# Patient Record
Sex: MALE | Race: WHITE | NOT HISPANIC OR LATINO | Employment: OTHER | URBAN - NONMETROPOLITAN AREA
[De-identification: names, ages, dates, MRNs, and addresses within clinical notes are randomized per-mention and may not be internally consistent; named-entity substitution may affect disease eponyms.]

---

## 2018-03-08 ENCOUNTER — DOCUMENTATION ONLY (OUTPATIENT)
Dept: FAMILY MEDICINE | Facility: OTHER | Age: 78
End: 2018-03-08

## 2018-03-08 PROBLEM — N18.30 CHRONIC KIDNEY DISEASE (CKD), STAGE III (MODERATE) (H): Status: ACTIVE | Noted: 2018-03-08

## 2018-03-08 PROBLEM — E78.5 HYPERLIPIDEMIA: Status: ACTIVE | Noted: 2018-03-08

## 2018-03-08 PROBLEM — I10 HYPERTENSION: Status: ACTIVE | Noted: 2018-03-08

## 2018-03-08 PROBLEM — E66.9 OBESITY: Status: ACTIVE | Noted: 2018-03-08

## 2018-03-08 PROBLEM — L60.0 ONYCHOCRYPTOSIS: Status: ACTIVE | Noted: 2018-03-08

## 2018-03-08 PROBLEM — G47.30 SLEEP APNEA: Status: ACTIVE | Noted: 2018-03-08

## 2018-03-08 PROBLEM — R73.01 FASTING HYPERGLYCEMIA: Status: ACTIVE | Noted: 2018-03-08

## 2018-03-08 RX ORDER — CHLORTHALIDONE 25 MG/1
TABLET ORAL
COMMUNITY
Start: 2015-12-15 | End: 2021-03-31

## 2018-03-08 RX ORDER — DICLOFENAC POTASSIUM 50 MG/1
50 TABLET, FILM COATED ORAL DAILY
COMMUNITY
Start: 2015-09-22 | End: 2021-03-31

## 2018-03-08 RX ORDER — METOPROLOL SUCCINATE 200 MG/1
TABLET, EXTENDED RELEASE ORAL
COMMUNITY
Start: 2015-12-15 | End: 2021-03-31

## 2018-03-08 RX ORDER — ALLOPURINOL 300 MG/1
TABLET ORAL
COMMUNITY
Start: 2015-12-15

## 2018-03-08 RX ORDER — METFORMIN HCL 500 MG
500 TABLET, EXTENDED RELEASE 24 HR ORAL DAILY
COMMUNITY
Start: 2016-02-22 | End: 2021-07-06

## 2018-03-08 RX ORDER — SIMVASTATIN 40 MG
40 TABLET ORAL AT BEDTIME
COMMUNITY
Start: 2015-12-15 | End: 2021-03-31

## 2018-03-08 RX ORDER — DILTIAZEM HYDROCHLORIDE 240 MG/1
CAPSULE, EXTENDED RELEASE ORAL
COMMUNITY
Start: 2016-07-22 | End: 2021-03-31

## 2020-10-22 ENCOUNTER — ALLIED HEALTH/NURSE VISIT (OUTPATIENT)
Dept: FAMILY MEDICINE | Facility: OTHER | Age: 80
End: 2020-10-22
Payer: MEDICARE

## 2020-10-22 DIAGNOSIS — Z20.822 COVID-19 RULED OUT: Primary | ICD-10-CM

## 2020-10-22 DIAGNOSIS — Z20.822 ENCOUNTER FOR LABORATORY TESTING FOR COVID-19 VIRUS: ICD-10-CM

## 2020-10-22 PROCEDURE — 99207 PR NO CHARGE NURSE ONLY: CPT

## 2020-10-22 PROCEDURE — U0003 INFECTIOUS AGENT DETECTION BY NUCLEIC ACID (DNA OR RNA); SEVERE ACUTE RESPIRATORY SYNDROME CORONAVIRUS 2 (SARS-COV-2) (CORONAVIRUS DISEASE [COVID-19]), AMPLIFIED PROBE TECHNIQUE, MAKING USE OF HIGH THROUGHPUT TECHNOLOGIES AS DESCRIBED BY CMS-2020-01-R: HCPCS | Mod: ZL

## 2020-10-22 PROCEDURE — C9803 HOPD COVID-19 SPEC COLLECT: HCPCS

## 2020-10-23 LAB
SARS-COV-2 RNA SPEC QL NAA+PROBE: NOT DETECTED
SPECIMEN SOURCE: NORMAL

## 2020-11-03 ENCOUNTER — TELEPHONE (OUTPATIENT)
Dept: CARDIAC REHAB | Facility: OTHER | Age: 80
End: 2020-11-03

## 2020-11-03 NOTE — TELEPHONE ENCOUNTER
[facility] called left message re:  Duane referral for cardiac rehab.  Upon review of his chart it appears they meant to call [facility] Cardiac Review.  RN called [facility] and referral given to Jeannette at [facility] for cardiac rehab.

## 2021-03-30 ENCOUNTER — TRANSFERRED RECORDS (OUTPATIENT)
Dept: HEALTH INFORMATION MANAGEMENT | Facility: OTHER | Age: 81
End: 2021-03-30

## 2021-03-31 DIAGNOSIS — I20.89 STABLE ANGINA (H): ICD-10-CM

## 2021-03-31 DIAGNOSIS — I82.622 ACUTE DEEP VEIN THROMBOSIS (DVT) OF LEFT UPPER EXTREMITY, UNSPECIFIED VEIN (H): ICD-10-CM

## 2021-03-31 DIAGNOSIS — R30.0 DYSURIA: ICD-10-CM

## 2021-03-31 DIAGNOSIS — I48.92 ATRIAL FLUTTER (H): ICD-10-CM

## 2021-03-31 DIAGNOSIS — G89.29 CHRONIC MIDLINE LOW BACK PAIN WITHOUT SCIATICA: ICD-10-CM

## 2021-03-31 DIAGNOSIS — I71.21 ASCENDING AORTIC ANEURYSM (H): ICD-10-CM

## 2021-03-31 DIAGNOSIS — Z95.1 S/P CABG (CORONARY ARTERY BYPASS GRAFT): ICD-10-CM

## 2021-03-31 DIAGNOSIS — M54.50 CHRONIC MIDLINE LOW BACK PAIN WITHOUT SCIATICA: ICD-10-CM

## 2021-03-31 DIAGNOSIS — R31.0 GROSS HEMATURIA: ICD-10-CM

## 2021-03-31 DIAGNOSIS — I77.1 SUBCLAVIAN ARTERY STENOSIS, LEFT (H): ICD-10-CM

## 2021-03-31 DIAGNOSIS — I48.91 ATRIAL FIBRILLATION (H): ICD-10-CM

## 2021-03-31 DIAGNOSIS — I25.10 ASCVD (ARTERIOSCLEROTIC CARDIOVASCULAR DISEASE): ICD-10-CM

## 2021-03-31 DIAGNOSIS — R80.9 MICROALBUMINURIA: ICD-10-CM

## 2021-03-31 RX ORDER — AMLODIPINE BESYLATE 10 MG/1
10 TABLET ORAL DAILY
COMMUNITY
Start: 2020-11-04 | End: 2024-02-06

## 2021-03-31 RX ORDER — ATORVASTATIN CALCIUM 40 MG/1
40 TABLET, FILM COATED ORAL AT BEDTIME
COMMUNITY
Start: 2020-07-28

## 2021-03-31 RX ORDER — CLOPIDOGREL BISULFATE 75 MG/1
75 TABLET ORAL DAILY
COMMUNITY
End: 2024-02-06

## 2021-03-31 RX ORDER — ASPIRIN 81 MG/1
81 TABLET, CHEWABLE ORAL DAILY
COMMUNITY
Start: 2020-11-04

## 2021-03-31 RX ORDER — NITROGLYCERIN 0.4 MG/1
0.4 TABLET SUBLINGUAL EVERY 5 MIN PRN
COMMUNITY
Start: 2020-07-28

## 2021-03-31 RX ORDER — CARVEDILOL 25 MG/1
1.5 TABLET ORAL 2 TIMES DAILY
COMMUNITY
Start: 2020-11-03 | End: 2024-02-06

## 2021-03-31 RX ORDER — FUROSEMIDE 20 MG
20 TABLET ORAL
COMMUNITY
Start: 2020-11-03 | End: 2024-02-06

## 2021-03-31 RX ORDER — POTASSIUM CHLORIDE 750 MG/1
10 TABLET, EXTENDED RELEASE ORAL DAILY
COMMUNITY
Start: 2020-11-03

## 2021-03-31 RX ORDER — FAMOTIDINE 20 MG
2 TABLET ORAL DAILY
COMMUNITY

## 2021-03-31 RX ORDER — ONDANSETRON 4 MG/1
4 TABLET, FILM COATED ORAL EVERY 12 HOURS PRN
COMMUNITY
Start: 2020-09-30

## 2021-03-31 RX ORDER — AMIODARONE HYDROCHLORIDE 200 MG/1
1 TABLET ORAL DAILY
COMMUNITY
Start: 2020-11-03 | End: 2024-02-06

## 2021-03-31 RX ORDER — BLOOD SUGAR DIAGNOSTIC
STRIP MISCELLANEOUS
COMMUNITY
Start: 2020-08-09

## 2021-03-31 RX ORDER — LANCETS
EACH MISCELLANEOUS
COMMUNITY
Start: 2020-08-09

## 2021-03-31 SDOH — HEALTH STABILITY: MENTAL HEALTH: HOW OFTEN DO YOU HAVE SIX OR MORE DRINKS ON ONE OCCASION?: NOT ASKED

## 2021-03-31 SDOH — HEALTH STABILITY: MENTAL HEALTH: HOW MANY DRINKS CONTAINING ALCOHOL DO YOU HAVE ON A TYPICAL DAY WHEN YOU ARE DRINKING?: 1 OR 2

## 2021-03-31 SDOH — HEALTH STABILITY: MENTAL HEALTH: HOW OFTEN DO YOU HAVE A DRINK CONTAINING ALCOHOL?: 2-4 TIMES A MONTH

## 2021-04-01 ENCOUNTER — OFFICE VISIT (OUTPATIENT)
Dept: UROLOGY | Facility: OTHER | Age: 81
End: 2021-04-01
Attending: UROLOGY
Payer: COMMERCIAL

## 2021-04-01 VITALS
DIASTOLIC BLOOD PRESSURE: 72 MMHG | SYSTOLIC BLOOD PRESSURE: 122 MMHG | RESPIRATION RATE: 18 BRPM | BODY MASS INDEX: 32.83 KG/M2 | WEIGHT: 206 LBS | HEART RATE: 59 BPM

## 2021-04-01 DIAGNOSIS — R31.0 GROSS HEMATURIA: Primary | ICD-10-CM

## 2021-04-01 PROCEDURE — G0463 HOSPITAL OUTPT CLINIC VISIT: HCPCS | Mod: 25

## 2021-04-01 PROCEDURE — 51798 US URINE CAPACITY MEASURE: CPT | Performed by: UROLOGY

## 2021-04-01 PROCEDURE — 99204 OFFICE O/P NEW MOD 45 MIN: CPT | Performed by: UROLOGY

## 2021-04-01 ASSESSMENT — PAIN SCALES - GENERAL: PAINLEVEL: NO PAIN (0)

## 2021-04-01 NOTE — PROGRESS NOTES
PCP:  Opal Vizcaino NP    Type of Visit  NPV    Chief Complaint  Hematuria    HPI  Mr. Chen is a 81 year old male with history of CVD who presents with hematuria.  Gross hematuria started 3 days ago.  Episode lasted about 2 days and then resolved spontaneously.  Patient denies associated dysuria at the time of onset.  Patient denies clots associated with hematuria.    He started Xarelto about 1 month ago.  The patient is a former smoker from the age of high school through the age of 52.  He has no history of kidney stones and has a history of a CT scan from 1 year ago revealing no stones at that time.    Hematuria-related signs/symptoms  History of smoking?    Yes  History of chemotherapy?   No  History of pelvic radiation?   No  History of kidney or bladder stones?  No  History of frequent urinary tract infections? No     Outside hospital records were reviewed including labs and notes from [facility]      Past Medical History  He  has a past medical history of Acute kidney failure (H), Acute tubulo-interstitial nephritis, Angina pectoris (H), Benign neoplasm of colon, Chronic kidney disease, stage III (moderate), Essential (primary) hypertension, Gout, Hyperlipidemia, Impaired fasting glucose, Obesity, Obesity, Other chest pain, and Sleep apnea.  Patient Active Problem List   Diagnosis     Allergic rhinitis     Atypical chest pain     Chronic kidney disease (CKD), stage III (moderate)     Diverticulosis of sigmoid colon     Fasting hyperglycemia     Gout     H/O adenomatous polyp of colon     Hemorrhoid prolapse     Hyperlipidemia     Hypertension     Obesity     Onychocryptosis     Onychomycosis     Rectal bleeding     Sleep apnea     Dysuria     Gross hematuria     Atrial fibrillation (H)     Atrial flutter (H)     Acute deep vein thrombosis (DVT) of left upper extremity, unspecified vein (H)     S/P CABG (coronary artery bypass graft)     ASCVD (arteriosclerotic cardiovascular disease)     Subclavian artery  stenosis, left (H)     Stable angina (H)     Ascending aortic aneurysm (H)     Microalbuminuria     Chronic midline low back pain without sciatica     Type 2 diabetes mellitus (H)       Past Surgical History  He  has a past surgical history that includes Colonoscopy; other surgical history; Appendectomy open; and Extracapsular cataract extration with intraocular lens implant.    Medications  He has a current medication list which includes the following prescription(s): allopurinol, amiodarone, amlodipine, aspirin, atorvastatin, accu-chek cm plus, blood glucose monitoring, carvedilol, clopidogrel, furosemide, magnesium oxide, metformin, nitroglycerin, ondansetron, potassium chloride er, rivaroxaban anticoagulant, and vitamin d (cholecalciferol).    Allergies  Allergies   Allergen Reactions     Amlodipine Unknown       Social History  He  reports that he quit smoking about 31 years ago. His smoking use included cigarettes. He has a 25.00 pack-year smoking history. He has never used smokeless tobacco. He reports current alcohol use of about 5.0 standard drinks of alcohol per week.  No drug abuse.    Family History  Family History   Problem Relation Age of Onset     Diabetes Mother         Diabetes     Diabetes Father         Diabetes     Diabetes Brother         Diabetes     Diabetes Brother         Diabetes       Review of Systems  I personally reviewed the ROS with the patient.    Nursing Notes:   Tiffani Coates LPN  4/1/2021 11:11 AM  Signed  Pt presents to clinic for hematuria consult    Review of Systems:    Weight loss:    No     Recent fever/chills:  No   Night sweats:   No  Current skin rash:  No   Recent hair loss:  No  Heat intolerance:  No   Cold intolerance:  No  Chest pain:   No   Palpitations:   No  Shortness of breath:  No   Wheezing:   No  Constipation:    No   Diarrhea:   No   Nausea:   Yes   Vomiting:   No   Kidney/side pain:  No   Back pain:   Yes  Frequent headaches:  No   Dizziness:      No  Leg swelling:   No   Calf pain:    No    Parents, brothers or sisters with history of kidney cancer:   No  Parents, brothers or sisters with history of bladder cancer: No  Father or brother with history of prostate cancer:  No    Post-Void Residual  A post-void residual was measured by ultrasonic bladder scanner.  2 mL          Physical Exam  Vitals:    04/01/21 1103   BP: 122/72   BP Location: Right arm   Cuff Size: Adult Regular   Pulse: 59   Resp: 18   Weight: 93.4 kg (206 lb)     Constitutional: No acute distress.  Alert and cooperative   Head: NCAT  Eyes: Conjunctivae normal  Cardiovascular: Regular rate.  Pulmonary/Chest: Respirations are even and non-labored bilaterally, no audible wheezing  Abdominal: Soft. No distension, tenderness, masses or guarding.   Neurological: A + O x 3.  Cranial Nerves II-XII grossly intact.  Extremities: MUSTAPHA x 4, Warm. No clubbing.  No cyanosis.    Skin: Pink, warm and dry.  No visible rashes noted.  Psychiatric:  Normal mood and affect  Back:  No left CVA tenderness.  No right CVA tenderness.  Genitourinary:  Nonpalpable bladder    Labs  Outside hospital records reviewed including labs  The patient's urinalysis from 3/30/2021 reveals no WBCs with greater than 100 RBCs/HPF    Imaging  CT a/p ([facility])  6/24/2020  I personally reviewed and interpreted the images and report.  IMPRESSION:   1.  Mild distal right periureteral fat stranding with slight dilation. No urolithiasis appreciated. This could reflect recent passage of a calculus. Consider CT urogram to further evaluate if indicated.  2.  Hepatic steatosis.     Post-Void Residual  A post-void residual was measured by ultrasonic bladder scanner.  2 mL today    Assessment  Mr. Chen is a 81 year old male with sterile gross hematuria.  I agree with holding antibiotics at this time as his urinalysis from 2 days ago demonstrates gross hematuria without pyuria.  In addition the patient's symptoms are not suggestive of  UTI.  I have recommended a work-up in the form of upper tract imaging and cystoscopy.    Discussed rationale for work up.  Discussed the specific indications for cross-sectional imaging as well as diagnostic cystoscopy.  Discussed potential findings of hematuria work up including, but not limited to, kidney stones, bladder tumors and/or kidney tumors.  Also discussed the potential for a normal work up.    Plan  CT Urogram with follow up for cystoscopy

## 2021-04-01 NOTE — PROGRESS NOTES
"Per last office visit  4/1/21 with Kannan Mora MD \"Plan  CT Urogram with follow up for cystoscopy\"        Orders Placed    "

## 2021-04-01 NOTE — NURSING NOTE
Pt presents to clinic for hematuria consult    Review of Systems:    Weight loss:    No     Recent fever/chills:  No   Night sweats:   No  Current skin rash:  No   Recent hair loss:  No  Heat intolerance:  No   Cold intolerance:  No  Chest pain:   No   Palpitations:   No  Shortness of breath:  No   Wheezing:   No  Constipation:    No   Diarrhea:   No   Nausea:   Yes   Vomiting:   No   Kidney/side pain:  No   Back pain:   Yes  Frequent headaches:  No   Dizziness:     No  Leg swelling:   No   Calf pain:    No    Parents, brothers or sisters with history of kidney cancer:   No  Parents, brothers or sisters with history of bladder cancer: No  Father or brother with history of prostate cancer:  No    Post-Void Residual  A post-void residual was measured by ultrasonic bladder scanner.  2 mL

## 2021-04-08 ENCOUNTER — HOSPITAL ENCOUNTER (OUTPATIENT)
Dept: CT IMAGING | Facility: OTHER | Age: 81
End: 2021-04-08
Attending: UROLOGY
Payer: MEDICARE

## 2021-04-08 ENCOUNTER — OFFICE VISIT (OUTPATIENT)
Dept: UROLOGY | Facility: OTHER | Age: 81
End: 2021-04-08
Attending: UROLOGY
Payer: MEDICARE

## 2021-04-08 VITALS — RESPIRATION RATE: 18 BRPM | BODY MASS INDEX: 32.83 KG/M2 | HEART RATE: 59 BPM | WEIGHT: 206 LBS

## 2021-04-08 DIAGNOSIS — R31.0 GROSS HEMATURIA: ICD-10-CM

## 2021-04-08 DIAGNOSIS — R31.0 GROSS HEMATURIA: Primary | ICD-10-CM

## 2021-04-08 LAB
ANION GAP SERPL CALCULATED.3IONS-SCNC: 10 MMOL/L (ref 3–14)
BUN SERPL-MCNC: 57 MG/DL (ref 7–25)
CALCIUM SERPL-MCNC: 8.9 MG/DL (ref 8.6–10.3)
CHLORIDE SERPL-SCNC: 103 MMOL/L (ref 98–107)
CO2 SERPL-SCNC: 25 MMOL/L (ref 21–31)
CREAT SERPL-MCNC: 2.91 MG/DL (ref 0.7–1.3)
CREAT SERPL-MCNC: 2.92 MG/DL (ref 0.7–1.3)
GFR SERPL CREATININE-BSD FRML MDRD: 21 ML/MIN/{1.73_M2}
GFR SERPL CREATININE-BSD FRML MDRD: 21 ML/MIN/{1.73_M2}
GLUCOSE SERPL-MCNC: 131 MG/DL (ref 70–105)
POTASSIUM SERPL-SCNC: 5 MMOL/L (ref 3.5–5.1)
SODIUM SERPL-SCNC: 138 MMOL/L (ref 134–144)

## 2021-04-08 PROCEDURE — 52000 CYSTOURETHROSCOPY: CPT | Performed by: UROLOGY

## 2021-04-08 PROCEDURE — 99213 OFFICE O/P EST LOW 20 MIN: CPT | Mod: 25 | Performed by: UROLOGY

## 2021-04-08 PROCEDURE — 80048 BASIC METABOLIC PNL TOTAL CA: CPT | Mod: ZL | Performed by: UROLOGY

## 2021-04-08 PROCEDURE — 82565 ASSAY OF CREATININE: CPT | Mod: ZL | Performed by: UROLOGY

## 2021-04-08 PROCEDURE — G0463 HOSPITAL OUTPT CLINIC VISIT: HCPCS | Mod: 25

## 2021-04-08 PROCEDURE — 74176 CT ABD & PELVIS W/O CONTRAST: CPT

## 2021-04-08 PROCEDURE — 36415 COLL VENOUS BLD VENIPUNCTURE: CPT | Mod: ZL | Performed by: UROLOGY

## 2021-04-08 NOTE — NURSING NOTE
Patient positioned in supine position, perineum area prepped with chlorhexidene Gluconate and patient draped per sterile technique. Per verbal order read back by Kannan Mora MD, Urojet 10mL 2% lidocaine jelly to be instilled into urethra.  Urojet- 10ml 2% Lidocaine jelly instilled into the urethra.    Urojet 2%  Lot#: [id number]  Expiration date: 10/2022  : [employer]  NDC: [address]-3    Hurt Protocol    A. Pre-procedure verification complete Yes  1-relevant information / documentation available, reviewed and properly matched to the patient; 2-consent accurate and complete, 3-equipment and supplies available    B. Site marking complete N/A  Site marked if not in continuous attendance with patient    C. TIME OUT completed Yes  Time Out was conducted just prior to starting procedure to verify the eight required elements: 1-patient identity, 2-consent accurate and complete, 3-position, 4-correct side/site marked (if applicable), 5-procedure, 6-relevant images / results properly labeled and displayed (if applicable), 7-antibiotics / irrigation fluids (if applicable), 8-safety precautions.    After procedure perineum area rinsed. Discharge instructions reviewed with patient. Patient verbalized understanding of discharge instructions and discharged ambulatory.  Tiffani Coates LPN..................4/8/2021  1:59 PM

## 2021-04-08 NOTE — PROGRESS NOTES
Preprocedure diagnosis  Hematuria    Postprocedure diagnosis  Hematuria    Procedure  Flexible Cystourethroscopy    Surgeon  Kannan Mora MD    Anesthesia  2% lidocaine jelly intraurethrally    Complications  None    Indications  81 year old male undergoing a flexible cystoscopy for the above mentioned indications.    Findings  Cystoscopic findings included a normal anterior urethra.    There was not a prominent median lobe.    The lateral lobes were not obstructive in appearance.  The bladder appeared to be normal capacity.    There were no tumors, stones or foreign bodies.    The orifices were slit-shaped and in their normal location.    Procedure  The patient was placed in supine position and prepped and draped in sterile fashion with lidocaine jelly per urethra for anesthesia.    I passed a lubricated 14F flexible cystoscope through the penile urethra and into the bladder and the bladder was completely visualized.  The cystoscope was retroflexed and the bladder neck and prostate visualized.    The cystoscope was slowly withdrawn while visualizing the urethra and the procedure terminated.    The patient tolerated the procedure well.      Imaging  CT Stone  4/8/2021  No stones or masses, hydronephrosis or bladder stones.    Results for orders placed or performed in visit on 04/08/21   Basic metabolic panel     Status: Abnormal   Result Value Ref Range    Sodium 138 134 - 144 mmol/L    Potassium 5.0 3.5 - 5.1 mmol/L    Chloride 103 98 - 107 mmol/L    Carbon Dioxide 25 21 - 31 mmol/L    Anion Gap 10 3 - 14 mmol/L    Glucose 131 (H) 70 - 105 mg/dL    Urea Nitrogen 57 (H) 7 - 25 mg/dL    Creatinine 2.92 (H) 0.70 - 1.30 mg/dL    GFR Estimate 21 (L) >60 mL/min/[1.73_m2]    GFR Estimate If Black 25 (L) >60 mL/min/[1.73_m2]    Calcium 8.9 8.6 - 10.3 mg/dL   Results for orders placed or performed in visit on 04/08/21   Creatinine     Status: Abnormal   Result Value Ref Range    Creatinine 2.91 (H) 0.70 - 1.30 mg/dL    GFR  Estimate 21 (L) >60 mL/min/[1.73_m2]    GFR Estimate If Black 25 (L) >60 mL/min/[1.73_m2]   Results for orders placed or performed during the hospital encounter of 04/08/21   CT Abdomen Pelvis w/o Contrast     Status: None    Narrative    PROCEDURE:  CT ABDOMEN PELVIS W/O CONTRAST    HISTORY:  Gross hematuria    TECHNIQUE:  Helical CT of the abdomen and pelvis was performed without  intravenous contrast. Post contrast imaging was deferred due to  progressive declining GFR, currently 21.    COMPARISON:  None.    FINDINGS:      Evaluation of the solid organs is somewhat limited due to the lack of  intravenous contrast.    Limited views through the lung bases demonstrate a small left pleural  effusion. There is cardiomegaly.    Precontrast imaging demonstrates no renal or ureteral calculi. There  is no hydronephrosis. Bladder is distended and otherwise unremarkable.  The prostate is enlarged. Multiple exophytic renal cortical lesions  are questioned, not well assessed absent contrast.    Small calcifications are present in the spleen. The noncontrast  appearance of the liver, gallbladder pancreas and adrenal glands are  unremarkable. The aorta is normal in size with scattered  atherosclerotic calcifications. The bowel is normal in caliber.     No suspicious osseous lesions are identified.      Impression    IMPRESSION:      No renal or ureteral calculi. No hydronephrosis.    Prostatomegaly. Distended bladder.    Indeterminant renal cortical lesions. Postcontrast imaging was  deferred due to declining low GFR. Consider ultrasound, although  habitus may limit assessment. Consider postcontrast imaging after GFR  improves.     Unilateral left pleural effusion. Recommend follow-up.    RALEIGH BLACKMON MD     Assessment & Plan  Mr. Chen is a 81 year old male who underwent imaging and cystoscopy today to complete the hematuria work-up.  Following the cystoscopy I reviewed the imaging and we discussed the imaging  results.  Provided reassurance given the negative work-up.  Regarding his sudden change in renal function I recommended urgent follow-up with his PCP.  I confirmed electrolytes today are all WNL.  Assistance was provided to schedule this appointment which has been scheduled for Tuesday, 4/13/2021              A total of 22 minutes (exclusive of separately billed services/procedures) was spent with the patient, reviewing records, tests, ordering medications, tests or procedures, counseling regarding the above described medical concern, recommendations regarding management and documenting clinical information in the EHR.

## 2021-04-08 NOTE — PROGRESS NOTES
IV Contrast- Discharge Instructions After Your CT Scan      The IV contrast you received today will be filtered from your bloodstream by your kidneys during the next 24 hours and pass from the body in urine.  You will not be aware of this process and your urine will not change in color.  To help this process you should drink at least 4 additional glasses of water or juice today.  This reduces stress on your kidneys.    Most contrast reactions are immediate.  Should you develop symptoms of concern after discharge, contact the department at the number below.  After hours you should contact your personal physician.  If you develop breathing distress or wheezing, call 911.      1.  Has the patient had a previous reaction to IV contrast? no    2.  Does the patient have kidney disease? Yes, CKD3    3.  Is the patient on dialysis? no    If YES to any of these questions, exam will be reviewed with a Radiologist before administering contrast.

## 2021-04-08 NOTE — PATIENT INSTRUCTIONS

## 2021-07-06 ENCOUNTER — HOSPITAL ENCOUNTER (EMERGENCY)
Facility: OTHER | Age: 81
Discharge: HOME | End: 2021-07-06
Attending: FAMILY MEDICINE | Admitting: FAMILY MEDICINE
Payer: MEDICARE

## 2021-07-06 VITALS
SYSTOLIC BLOOD PRESSURE: 151 MMHG | TEMPERATURE: 97.5 F | RESPIRATION RATE: 18 BRPM | HEIGHT: 66 IN | WEIGHT: 223.77 LBS | DIASTOLIC BLOOD PRESSURE: 76 MMHG | HEART RATE: 60 BPM | OXYGEN SATURATION: 97 % | BODY MASS INDEX: 35.96 KG/M2

## 2021-07-06 DIAGNOSIS — R04.0 BLEEDING FROM THE NOSE: ICD-10-CM

## 2021-07-06 DIAGNOSIS — T50.905A ADVERSE DRUG EFFECT, INITIAL ENCOUNTER: ICD-10-CM

## 2021-07-06 PROCEDURE — 99282 EMERGENCY DEPT VISIT SF MDM: CPT | Mod: 25 | Performed by: FAMILY MEDICINE

## 2021-07-06 PROCEDURE — 30901 CONTROL OF NOSEBLEED: CPT | Performed by: FAMILY MEDICINE

## 2021-07-06 PROCEDURE — 250N000009 HC RX 250: Performed by: FAMILY MEDICINE

## 2021-07-06 RX ORDER — METOPROLOL TARTRATE 25 MG/1
TABLET, FILM COATED ORAL
COMMUNITY
Start: 2020-10-19 | End: 2024-02-06

## 2021-07-06 RX ORDER — TRANEXAMIC ACID 100 MG/ML
INJECTION, SOLUTION INTRAVENOUS ONCE
Status: DISCONTINUED | OUTPATIENT
Start: 2021-07-06 | End: 2021-07-06 | Stop reason: HOSPADM

## 2021-07-06 RX ADMIN — SILVER NITRATE APPLICATORS: 25; 75 STICK TOPICAL at 08:52

## 2021-07-06 ASSESSMENT — MIFFLIN-ST. JEOR: SCORE: 1672.75

## 2021-07-06 NOTE — ED TRIAGE NOTES
"ED Nursing Triage Note (General)   ________________________________    Duane H Telecky is a 81 year old Male that presents to triage private car  With history of  Having a sore on the outside of his nose that is open and will not stop bleeding. Pt said this started this morning and on blood thinners.    BP (!) 178/88   Pulse 66   Temp 97.5  F (36.4  C) (Tympanic)   Resp 18   Ht 1.676 m (5' 6\")   Wt 101.5 kg (223 lb 12.3 oz)   SpO2 97%   BMI 36.12 kg/m  t  Patient appears alert  and oriented, in no acute distress., and cooperative, pleasant and calm behavior.  GCS Total = 15  Airway: intact  Breathing noted as Normal  Circulation Normal  Skin:  Normal  Action taken:  Triage to critical care immediately      PRE HOSPITAL PRIOR LIVING SITUATION Alone  "

## 2021-07-06 NOTE — DISCHARGE INSTRUCTIONS
Use bacitracin ointment once daily to affected area.   Pat face dry- don't rub!   Try not to  touch area other than to apply ointment at least once daily.   Followup here if recurrence.

## 2022-10-18 ENCOUNTER — HOSPITAL ENCOUNTER (OUTPATIENT)
Dept: GENERAL RADIOLOGY | Facility: OTHER | Age: 82
Discharge: HOME | End: 2022-10-18
Attending: ORTHOPAEDIC SURGERY | Admitting: FAMILY MEDICINE
Payer: MEDICARE

## 2022-10-18 DIAGNOSIS — M54.50 ACUTE RIGHT-SIDED LOW BACK PAIN: ICD-10-CM

## 2022-10-18 DIAGNOSIS — M47.816 LUMBAR SPONDYLOSIS: ICD-10-CM

## 2022-10-18 PROCEDURE — 64494 INJ PARAVERT F JNT L/S 2 LEV: CPT

## 2022-10-18 PROCEDURE — 255N000002 HC RX 255 OP 636: Performed by: RADIOLOGY

## 2022-10-18 PROCEDURE — 250N000009 HC RX 250: Performed by: RADIOLOGY

## 2022-10-18 RX ORDER — BUPIVACAINE HYDROCHLORIDE 5 MG/ML
10 INJECTION, SOLUTION EPIDURAL; INTRACAUDAL ONCE
Status: COMPLETED | OUTPATIENT
Start: 2022-10-18 | End: 2022-10-18

## 2022-10-18 RX ORDER — LIDOCAINE HYDROCHLORIDE 10 MG/ML
20 INJECTION, SOLUTION INFILTRATION; PERINEURAL ONCE
Status: COMPLETED | OUTPATIENT
Start: 2022-10-18 | End: 2022-10-18

## 2022-10-18 RX ADMIN — IOHEXOL 2 ML: 240 INJECTION, SOLUTION INTRATHECAL; INTRAVASCULAR; INTRAVENOUS; ORAL at 15:05

## 2022-10-18 RX ADMIN — LIDOCAINE HYDROCHLORIDE 4 ML: 10 INJECTION, SOLUTION INFILTRATION; PERINEURAL at 15:06

## 2022-10-18 RX ADMIN — BUPIVACAINE HYDROCHLORIDE 4 ML: 5 INJECTION, SOLUTION EPIDURAL; INTRACAUDAL; PERINEURAL at 15:06

## 2023-01-09 ENCOUNTER — HOSPITAL ENCOUNTER (OUTPATIENT)
Dept: GENERAL RADIOLOGY | Facility: OTHER | Age: 83
Discharge: HOME | End: 2023-01-09
Attending: ORTHOPAEDIC SURGERY | Admitting: ORTHOPAEDIC SURGERY
Payer: MEDICARE

## 2023-01-09 DIAGNOSIS — M47.816 LUMBAR SPONDYLOSIS: ICD-10-CM

## 2023-01-09 DIAGNOSIS — M54.50 LOW BACK PAIN: ICD-10-CM

## 2023-01-09 PROCEDURE — 64494 INJ PARAVERT F JNT L/S 2 LEV: CPT

## 2023-01-09 PROCEDURE — 250N000009 HC RX 250: Performed by: RADIOLOGY

## 2023-01-09 PROCEDURE — 255N000002 HC RX 255 OP 636: Performed by: RADIOLOGY

## 2023-01-09 RX ORDER — LIDOCAINE HYDROCHLORIDE 10 MG/ML
3 INJECTION, SOLUTION INFILTRATION; PERINEURAL ONCE
Status: COMPLETED | OUTPATIENT
Start: 2023-01-09 | End: 2023-01-09

## 2023-01-09 RX ORDER — BUPIVACAINE HYDROCHLORIDE 5 MG/ML
3 INJECTION, SOLUTION EPIDURAL; INTRACAUDAL ONCE
Status: COMPLETED | OUTPATIENT
Start: 2023-01-09 | End: 2023-01-09

## 2023-01-09 RX ADMIN — BUPIVACAINE HYDROCHLORIDE 3 ML: 5 INJECTION, SOLUTION EPIDURAL; INTRACAUDAL; PERINEURAL at 12:09

## 2023-01-09 RX ADMIN — LIDOCAINE HYDROCHLORIDE 3 ML: 10 INJECTION, SOLUTION INFILTRATION; PERINEURAL at 12:09

## 2023-01-09 RX ADMIN — IOHEXOL 2 ML: 240 INJECTION, SOLUTION INTRATHECAL; INTRAVASCULAR; INTRAVENOUS; ORAL at 12:09

## 2023-04-03 ENCOUNTER — TELEPHONE (OUTPATIENT)
Dept: GENERAL RADIOLOGY | Facility: OTHER | Age: 83
End: 2023-04-03
Payer: COMMERCIAL

## 2023-04-03 DIAGNOSIS — I48.91 ATRIAL FIBRILLATION (H): Primary | ICD-10-CM

## 2023-04-03 NOTE — TELEPHONE ENCOUNTER
" Patient is scheduled for unilateral lumbar rhizotomy on 4/10/23.     Current allergies:    Lisinopril   angioedema     Coreg [Carvedilol] RASH     Diltiazem RASH     Hydralazine RASH     Lasix [Furosemide] RASH   Rash and itchy     Oxycodone Hallucinations   \"I could see people trying to kill me\"     Warfarin RASH   Rash resolved after stopping warfarin     Xarelto [Rivaroxaban] RASH     Chlorthalidone   Dose over 12.5 mg daily causes creatinine to increase     Norvasc [Amlodipine] Other   Other reaction(s): *Unknown        Current outpatient medications:    cloNIDine (Catapres) 0.2 MG tablet Take 1 Tablet by mouth three times a day.     potassium chloride CR (K-Dur, Klor-Con M) 10 MEQ tablet TAKE 1 TABLET EVERY DAY. DO NOT CRUSH     nitroglycerin (Nitrostat) 0.4 MG sublingual tablet Place 1 Tablet under the tongue every five minutes as needed for Chest pain. Do not crush; maximum of 3 doses in 15 minutes.     atorvaSTATin (Lipitor) 40 MG tablet TAKE 1 TABLET AT BEDTIME     ciclopirox (Loprox) 0.77 % cream Apply to affected areas of the feet twice daily     ondansetron (Zofran) 4 MG tablet TAKE 1 TABLET EVERY 12 HOURS AS NEEDED FOR NAUSEA.     allopurinol (Zyloprim) 300 MG tablet Take 0.5 Tabs by mouth one time a day.     magnesium oxide 400 (240 Mg) MG Tablet Take 1 Tablet by mouth two times a day.     torsemide (Demadex) 10 MG tablet Take 1 Tablet by mouth every Monday, Wednesday and Friday. Take one tablet daily for one week and then return to three times a week dosing.     Microlet Lancets Misc USE TO CHECK BLOOD SUGARS ONCE DAILY AS DIRECTED     Contour Next Test USE TO CHECK BLOOD SUGARS ONCE DAILY OR AS DIRECTED, ICD-10-CM: E11.9, NON INSULIN DEPENDENT.     Blood Glucose Monitoring Suppl (Contour Next EZ) w/Device Kit Use as directed.     aspirin EC 81 MG tablet Take 1 Tablet by mouth one time a day. Do not split or crush.     cholecalciferol (Vitamin D3) 1000 UNIT capsule Take 2 Capsules by mouth one time a " day. 1 unit of Vitamin D equals 0.025 mcg of Vitamin D       Any anticoagulants or blood thinners? Yes, Aspirin.  If yes, patient was instructed to follow MD orders for stopping anticoagulants or blood thinners.     Any insulin or oral antihyperglycemic meds? No. If so, hold according to policy day of procedure.    Are you being treated for an infection (on antibiotics)? No. If yes, discuss with radiologist about when it is appropriate to schedule radiology procedure.    Procedure name: unilateral lumbar rhizotomy  Procedure date verified: Yes, 4/10/23.  Arrival time verified: 1215 PM Please disregard any automatic or other arrival time instructions. This is the accurate time for arrival.  Facility location verified: [address] [place] [address]. Instructed to enter through main clinic entrance, wear a mask, and proceed to Diagnostic Registration.  Plan to be here for approximately 3-3.5 hours. Explain current visitor policy to patient.    COVID test: no longer required for outpatient procedures.    Pre-procedure optimization completed within 30 days. Lab results were reviewed by this writer and are WNL or any abnormal labs discussed with radiologist. CBC RESULTS:     Any mention of previous ASA class in chart (use search tool)? 2 - Mild systemic disease (if no mention of ASA AND there are co-morbidities, call [phone] for CRNA chart review.  HARD STOP FOR ASA III, IV, or V. No moderate sedation outside of OR for ASA III, IV, or V. Inform Dr. Hester of ASA III, IV, or V at time of this note. Radiologist can consult with CRNA if any questions regarding ASA status.     CRNA consulted? Not Applicable If yes, name of CRNA performing chart review? n/a     needed? No, language: N/A    Previous sedation/analgesia: Yes  Complications of sedation/analgesia?  No  Will moderate sedation be used for this case? Yes: as planned    CPAP machine use: Yes (If yes, instructed to bring CPAP to all procedures  using moderate sedation)    Is patient a menstruating female? No If so, place order for [id number], and then call [phone] to make lab appt (must be within 7 days).    No solids after 0730 AM. (6 hours prior to start of procedure.)  No clear liquids after 1030 AM. (3 hours prior to start of procedure.)    Instructions given: expectations for procedure, expectations for recovery, dietary restrictions, and .  Preprocedure teaching completed: Yes  Method: verbal per phone.  People present for teaching: Patient  Response to teaching: patient demonstrates understanding of procedure, recovery, dietary restrictions, and .  Transportation after procedure: Yes: will arrange a ride after procedure.  Any questions or patient concerns? Yes: all questions answered

## 2023-04-10 ENCOUNTER — LAB (OUTPATIENT)
Dept: LAB | Facility: OTHER | Age: 83
End: 2023-04-10
Attending: ORTHOPAEDIC SURGERY
Payer: MEDICARE

## 2023-04-10 ENCOUNTER — HOSPITAL ENCOUNTER (OUTPATIENT)
Dept: GENERAL RADIOLOGY | Facility: OTHER | Age: 83
Discharge: HOME | End: 2023-04-10
Attending: ORTHOPAEDIC SURGERY
Payer: MEDICARE

## 2023-04-10 VITALS
HEART RATE: 82 BPM | DIASTOLIC BLOOD PRESSURE: 83 MMHG | SYSTOLIC BLOOD PRESSURE: 143 MMHG | OXYGEN SATURATION: 95 % | TEMPERATURE: 97.7 F | RESPIRATION RATE: 16 BRPM

## 2023-04-10 DIAGNOSIS — I48.91 ATRIAL FIBRILLATION (H): ICD-10-CM

## 2023-04-10 DIAGNOSIS — M25.48 EFFUSION OF LUMBAR FACET JOINT: ICD-10-CM

## 2023-04-10 DIAGNOSIS — M54.50 CHRONIC RIGHT-SIDED LOW BACK PAIN: ICD-10-CM

## 2023-04-10 DIAGNOSIS — M47.896 OTHER SPONDYLOSIS, LUMBAR REGION: ICD-10-CM

## 2023-04-10 DIAGNOSIS — G89.29 CHRONIC RIGHT-SIDED LOW BACK PAIN: ICD-10-CM

## 2023-04-10 LAB
HOLD SPECIMEN: NORMAL
INR PPP: 0.97 (ref 0.85–1.15)

## 2023-04-10 PROCEDURE — 250N000009 HC RX 250: Performed by: RADIOLOGY

## 2023-04-10 PROCEDURE — 250N000011 HC RX IP 250 OP 636: Performed by: RADIOLOGY

## 2023-04-10 PROCEDURE — 36415 COLL VENOUS BLD VENIPUNCTURE: CPT | Mod: ZL

## 2023-04-10 PROCEDURE — 85610 PROTHROMBIN TIME: CPT | Mod: ZL

## 2023-04-10 PROCEDURE — 64635 DESTROY LUMB/SAC FACET JNT: CPT

## 2023-04-10 PROCEDURE — 99152 MOD SED SAME PHYS/QHP 5/>YRS: CPT

## 2023-04-10 RX ORDER — DEXAMETHASONE SODIUM PHOSPHATE 10 MG/ML
10 INJECTION, SOLUTION INTRAMUSCULAR; INTRAVENOUS ONCE
Status: COMPLETED | OUTPATIENT
Start: 2023-04-10 | End: 2023-04-10

## 2023-04-10 RX ORDER — FENTANYL CITRATE 50 UG/ML
25-50 INJECTION, SOLUTION INTRAMUSCULAR; INTRAVENOUS EVERY 5 MIN PRN
Status: DISCONTINUED | OUTPATIENT
Start: 2023-04-10 | End: 2023-04-11 | Stop reason: HOSPADM

## 2023-04-10 RX ORDER — NALOXONE HYDROCHLORIDE 0.4 MG/ML
0.4 INJECTION, SOLUTION INTRAMUSCULAR; INTRAVENOUS; SUBCUTANEOUS
Status: DISCONTINUED | OUTPATIENT
Start: 2023-04-10 | End: 2023-04-11 | Stop reason: HOSPADM

## 2023-04-10 RX ORDER — NALOXONE HYDROCHLORIDE 0.4 MG/ML
0.2 INJECTION, SOLUTION INTRAMUSCULAR; INTRAVENOUS; SUBCUTANEOUS
Status: DISCONTINUED | OUTPATIENT
Start: 2023-04-10 | End: 2023-04-11 | Stop reason: HOSPADM

## 2023-04-10 RX ORDER — BUPIVACAINE HYDROCHLORIDE 5 MG/ML
3 INJECTION, SOLUTION EPIDURAL; INTRACAUDAL ONCE
Status: COMPLETED | OUTPATIENT
Start: 2023-04-10 | End: 2023-04-10

## 2023-04-10 RX ORDER — LIDOCAINE HYDROCHLORIDE 10 MG/ML
2 INJECTION, SOLUTION INFILTRATION; PERINEURAL ONCE
Status: COMPLETED | OUTPATIENT
Start: 2023-04-10 | End: 2023-04-10

## 2023-04-10 RX ADMIN — BUPIVACAINE HYDROCHLORIDE 7 ML: 5 INJECTION, SOLUTION EPIDURAL; INTRACAUDAL; PERINEURAL at 14:39

## 2023-04-10 RX ADMIN — LIDOCAINE HYDROCHLORIDE 3 ML: 10 INJECTION, SOLUTION INFILTRATION; PERINEURAL at 14:37

## 2023-04-10 RX ADMIN — FENTANYL CITRATE 75 MCG: 50 INJECTION, SOLUTION INTRAMUSCULAR; INTRAVENOUS at 16:05

## 2023-04-10 RX ADMIN — DEXAMETHASONE SODIUM PHOSPHATE 10 MG: 10 INJECTION INTRAMUSCULAR; INTRAVENOUS at 14:38

## 2023-04-10 NOTE — PROGRESS NOTES
"   Allergies   Allergen Reactions     Clonidine Shortness Of Breath, Dermatitis, Hives, Itching and Rash     Lisinopril      angioedema     Furosemide Rash     Rash and itchy     Oxycodone Visual Disturbance     \"I could see people trying to kill me\"     Amlodipine Unknown     Chlorthalidone      Dose over 12.5 mg daily causes creatinine to increase     Coreg [Carvedilol] Rash     Diltiazem Rash     Hydralazine Rash     Warfarin Rash     Rash resolved after stopping warfarin       Current Outpatient Medications   Medication     allopurinol (ZYLOPRIM) 300 MG tablet     amiodarone (PACERONE) 200 MG tablet     amLODIPine (NORVASC) 10 MG tablet     aspirin (ASA) 81 MG chewable tablet     atorvastatin (LIPITOR) 40 MG tablet     blood glucose (ACCU-CHEK MAIDA PLUS) test strip     blood glucose monitoring (SOFTCLIX) lancets     carvedilol (COREG) 25 MG tablet     clopidogrel (PLAVIX) 75 MG tablet     furosemide (LASIX) 20 MG tablet     magnesium oxide (MAG-OX) 400 (241.3 Mg) MG tablet     metoprolol tartrate (LOPRESSOR) 25 MG tablet     nitroGLYcerin (NITROSTAT) 0.4 MG sublingual tablet     ondansetron (ZOFRAN) 4 MG tablet     potassium chloride ER (K-TAB/KLOR-CON) 10 MEQ CR tablet     rivaroxaban ANTICOAGULANT (XARELTO) 15 MG TABS tablet     Vitamin D, Cholecalciferol, 25 MCG (1000 UT) CAPS     No current facility-administered medications for this encounter.       Previously documented ASA: 2 - Mild systemic disease (search chart for ASA) If ASA IV    Post Procedure Summary:    Prior to the start of the procedure, with procedural staff and physician participation, I verbally confirmed the patient s identity using two indicators, relevant allergies, that the procedure was appropriate and matched the consent or emergent situation, and that the correct equipment/implants were available. Immediately prior to starting the procedure I conducted the Time Out with the procedural staff and re-confirmed the patient s name, " procedure, and site/side. (The Joint Commission universal protocol was followed.)  Yes       Medications: Fentanyl 75 mcg  (moderate sedation was NOT used today)     Reversals given: None (If reversals given, must recover for at least 2 hours)    Vital signs, airway and pulse oximetry, capnography, and cardiac rhythm were monitored and remained stable throughout the procedure and sedation was maintained until the procedure was complete.  The patient was monitored by staff until sedation discharge criteria were met.    Patient tolerance: Patient tolerated the procedure well with no immediate complications.    Band-aids x 3.  CDI.  Conductive pad site located at right posterior thigh.  Skin intact.      Duane will be discharged via ride by friend Андрей.     Patient and person driving patient home verbalized understanding of discharge instructions and recommended follow up care as noted on discharge instructions.  Written discharge instructions given, denies any further questions. Belongings sent with patient.     Pain Level: 0/10

## 2023-04-10 NOTE — DISCHARGE INSTRUCTIONS
" Radiofrequency Ablation - Rhizotomy    Radiofrequency (RF) rhizotomy, or radiofrequency ablation is a therapeutic procedure designed to decrease or eliminate nerve pain symptoms that have not responded to more conservative pain treatments. The procedure uses highly localized heat generated with radiofrequency to damage the nerves causing the pain. Damaging these nerves prevents pain signals from being transmitted from the spine to the brain. In other words, the procedure should \"turn down\" the intensity of the pain. A successful procedure reduces pain without reducing nerve function.    Preliminary Testing    Before an RF rhizotomy exam can be scheduled, it is important for our radiologists to identify where the pain originates. Exams required to determine these areas might include MRI of the spine, nerve block injections with local anesthetic (numbing medication) and steroids (anti-inflammatory medication), and possibly discography.    What to Expect AFTER    When your procedure is complete, you ll be escorted back to your room so you can change out of the gown and back into your clothing.  If you are sedated, a nurse will review some guidelines that you will be asked to follow post-procedure, such as driving, drinking alcoholic beverages, etc.  You may experience numbness and/or relief from symptoms after the procedure due to the anesthetic.  Once the local anesthetic effects have worn off, your usual symptoms may return and may be more severe for up to seven days after the procedure.  Improvement occurs typically about two to three weeks after the procedure. However, it may take up to six weeks before symptoms improve and the beneficial effects of the rhizotomy are realized. Every patient is different and your outcome may vary. A sunburn-like discomfort is common, and icing the area may help with that. It should gradually go away. Take your usual pain medications if you need to.   It is sometimes helpful to keep " a record of your daily pain level to notice better the relief you may be experiencing.    [facility] Procedure   Adult Discharge Orders & Instructions      For 24 hours after surgery:  Get plenty of rest.  A responsible adult must stay with you for at least 24 hours after you leave the hospital.   You may feel lightheaded.  IF so, sit for a few minutes before standing.  Have someone help you get up.   You may have a slight fever. Call the doctor if your fever is over 101 F (38.3 C) (taken under the tongue) or lasts longer than 24 hours.  You may have a dry mouth, a sore throat, muscle aches or trouble sleeping.  These should go away after 24 hours.  Do not make important or legal decisions.  6.   Do not drive or use heavy equipment.  If you have weakness or tingling, don't drive or use heavy equipment until this feeling goes away.    To reach a doctor, please call [phone] and tell the person who answers that you had a rhizotomy by Dr. Hester on 4/10/23.     It was a privilege caring for you today. I aim to give you the highest quality care, spending time to review your chart to be familiar with your particular health issues. Thank you for allowing me to serve you today. If you have any questions or concerns, or you have ideas for how I could have improved your care today, please don't hesitate to reach me at my office number Monday - Friday from 8:00 am - 4:30 pm at [phone]. I wish you wellness. It was my pleasure to meet you.     Warmly,       Felicitas CARTER RN - [facility] Imaging Department

## 2023-05-23 PROBLEM — D50.9 IRON DEFICIENCY ANEMIA: Status: ACTIVE | Noted: 2021-05-22

## 2023-05-23 PROBLEM — Z00.00 HEALTHCARE MAINTENANCE: Status: ACTIVE | Noted: 2020-03-30

## 2023-05-23 PROBLEM — Z87.891 FORMER SMOKER: Status: ACTIVE | Noted: 2020-08-28

## 2023-05-23 PROBLEM — E53.8 LOW FOLATE: Status: ACTIVE | Noted: 2022-11-22

## 2023-05-23 PROBLEM — Z95.818 PRESENCE OF WATCHMAN LEFT ATRIAL APPENDAGE CLOSURE DEVICE: Status: ACTIVE | Noted: 2021-12-03

## 2023-05-23 PROBLEM — I48.0 PAROXYSMAL ATRIAL FIBRILLATION (H): Status: ACTIVE | Noted: 2021-01-26

## 2023-05-23 PROBLEM — N18.4 CKD (CHRONIC KIDNEY DISEASE) STAGE 4, GFR 15-29 ML/MIN (H): Status: ACTIVE | Noted: 2017-08-07

## 2023-05-30 NOTE — PATIENT INSTRUCTIONS
Thank you for allowing Inna Chowdhury and our ENT team to participate in your care.  If your medications are too expensive, please give the nurse a call.  We can possibly change this medication.  If you have a scheduling or an appointment question please contact our Health Unit Coordinator at their direct line [phone] ext [phone].   ALL nursing questions or concerns can be directed to your ENT nurse at: [phone] - ann       POST PROCEDURE INSTRUCTIONS      Wash incision with Gentle Cleanser Twice Daily (Cetaphil, Baby Shampoo, etc)  Apply warm compress daily for 2 weeks or until the lesion is healed  Cover with a clean dressing if in a dirty victorina environment or when wet/soiled  Keep incision clean and dry   Do NOT soak in water such as a tub bath or swimming   Do NOT put make-up, powders, hairspray, lotions, etc on the incision     You can apply ice to the surgical area to help reduce swelling. (no longer than 20 minutes at a time)    You can use acetaminophen(Tylenol) or the prescription you received for pain.     If you have any bleeding, cover the wound with clean gauze and hold pressure for 10 Minutes. If the bleeding does not stop or is heavy and profuse, call the clinic or go to the Urgent Care/Emergency Department.    SIGNS OF INFECTION ARE:  Redness, swelling, red streaks, pus, drainage, warmth, fever, increased pain, foul smell.   Contact your primary health care provider if you notice any of the warning signs.          6 WEEKS POST PROCEDURE    Apply ANY type of lotion to the suture site(Example - Vaseline Intensive Care or Vitamin E)  Massage the surgical area 1-2 times daily in a circular motion for 5 minutes, for a period of 2 months. This will help the scar heal better.

## 2023-05-31 ENCOUNTER — OFFICE VISIT (OUTPATIENT)
Dept: OTOLARYNGOLOGY | Facility: OTHER | Age: 83
End: 2023-05-31
Attending: NURSE PRACTITIONER
Payer: MEDICARE

## 2023-05-31 VITALS
TEMPERATURE: 99.1 F | HEART RATE: 87 BPM | DIASTOLIC BLOOD PRESSURE: 80 MMHG | SYSTOLIC BLOOD PRESSURE: 182 MMHG | WEIGHT: 195 LBS | OXYGEN SATURATION: 97 % | BODY MASS INDEX: 31.47 KG/M2

## 2023-05-31 DIAGNOSIS — J34.0 NASAL SEPTAL ABSCESS: ICD-10-CM

## 2023-05-31 DIAGNOSIS — L23.9 ALLERGIC CONTACT DERMATITIS, UNSPECIFIED TRIGGER: ICD-10-CM

## 2023-05-31 DIAGNOSIS — J30.9 ALLERGIC RHINITIS, UNSPECIFIED SEASONALITY, UNSPECIFIED TRIGGER: Primary | ICD-10-CM

## 2023-05-31 PROCEDURE — 36415 COLL VENOUS BLD VENIPUNCTURE: CPT | Mod: ZL | Performed by: NURSE PRACTITIONER

## 2023-05-31 PROCEDURE — 30020 DRAINAGE OF NOSE LESION: CPT | Performed by: NURSE PRACTITIONER

## 2023-05-31 PROCEDURE — 86003 ALLG SPEC IGE CRUDE XTRC EA: CPT | Mod: ZL | Performed by: NURSE PRACTITIONER

## 2023-05-31 PROCEDURE — G0463 HOSPITAL OUTPT CLINIC VISIT: HCPCS | Mod: 25

## 2023-05-31 PROCEDURE — 82785 ASSAY OF IGE: CPT | Mod: ZL | Performed by: NURSE PRACTITIONER

## 2023-05-31 PROCEDURE — 99203 OFFICE O/P NEW LOW 30 MIN: CPT | Mod: 25 | Performed by: NURSE PRACTITIONER

## 2023-05-31 RX ORDER — CETIRIZINE HYDROCHLORIDE 10 MG/1
10 TABLET ORAL DAILY
COMMUNITY
Start: 2023-04-13

## 2023-05-31 ASSESSMENT — PAIN SCALES - GENERAL: PAINLEVEL: NO PAIN (0)

## 2023-05-31 NOTE — PROGRESS NOTES
"Otolaryngology Note         Chief Complaint:     Patient presents with:  Consult For: Allergy            History of Present Illness:     Duane H Telecky is a 83 year old male seen today for concerns for a rash on his back.  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  He had watchman completed on 11/24/21.    He has a history of skin sensitivity, significant itchy rash on his back after starting clonidine.  This worsened after the clonidine dosing was increased.  He was following with his primary care provider.  Punch biopsy completed of the back shows contact allergic dermatitis.      The rash got better when the clonidine was decreased to 0.1 mg.    He was treated with prednisone with almost resolution of symptoms.  Once he stopped the prednisone the rash returned.      He now has a rash on the flexor portion of the arms bilaterally. This is not itchy.  This started in the last month or so.    He now has some irritation in the nose that started Sunday/Monday.  Feels he has a ingrown hair or abscess on the columella.    He is able to breath through his nose well.  He wears CPAP mask to sleep, has a rash on the skin where he wears the mask    Hairline itches.      He is not currently on Clonidine, stopped 8.5 weeks ago.  He has a rash on head, arms, legs.  The rash on his back has significantly improved    He was placed on zyrtec and xyzal with minimal improvement.  Itching overall has improved.  He has been off the cetrizine and xyzal x 3 days in anticipation of allergy testing.  He has not noted a significant change in itching since stopping.      He reports a history of a similar itchy rash in the distant past, treated with \"2 shots in the butt\" and had resolution.    Associated allergy symptoms include eye itching, eye watering, clear rhinorrhea, no frequent sneezing.      No chronic cough  No wheezing or shortness of breath    No concerns for " food allergies.    Somewhat of a poor historian, difficult to obtain rash history, tangential         Medications:     Current Outpatient Rx   Medication Sig Dispense Refill     allopurinol (ZYLOPRIM) 300 MG tablet        amiodarone (PACERONE) 200 MG tablet Take 1 tablet by mouth daily       amLODIPine (NORVASC) 10 MG tablet Take 10 mg by mouth daily       aspirin (ASA) 81 MG chewable tablet Take 81 mg by mouth daily Chew and swallow, take with food       atorvastatin (LIPITOR) 40 MG tablet Take 40 mg by mouth At Bedtime       blood glucose (ACCU-CHEK MAIDA PLUS) test strip USE TO CHECK BLOOD SUGARS ONCE DAILY OR AS DIRECTED, ICD-10-CM: E11.9, NON INSULIN DEPENDENT.       blood glucose monitoring (SOFTCLIX) lancets USE TO CHECK BLOOD SUGARS ONCE DAILY AS DIRECTED       carvedilol (COREG) 25 MG tablet Take 1.5 tablets by mouth 2 times daily       cetirizine (ZYRTEC) 10 MG tablet Take 10 mg by mouth daily       clopidogrel (PLAVIX) 75 MG tablet Take 75 mg by mouth daily       furosemide (LASIX) 20 MG tablet Take 20 mg by mouth Use as directed by your prescriber. Complete directions are included in a letter with your original order.       magnesium oxide (MAG-OX) 400 (241.3 Mg) MG tablet Take 400 mg by mouth 2 times daily       metoprolol tartrate (LOPRESSOR) 25 MG tablet        nitroGLYcerin (NITROSTAT) 0.4 MG sublingual tablet Place 0.4 mg under the tongue every 5 minutes as needed for chest pain Do not crush; maximum of 3 doses in 15 minutes       ondansetron (ZOFRAN) 4 MG tablet Take 4 mg by mouth every 12 hours as needed for nausea       potassium chloride ER (K-TAB/KLOR-CON) 10 MEQ CR tablet Take 10 mEq by mouth daily       rivaroxaban ANTICOAGULANT (XARELTO) 15 MG TABS tablet Take 1 tablet by mouth every morning with breakfast       Vitamin D, Cholecalciferol, 25 MCG (1000 UT) CAPS Take 2 capsules by mouth daily              Allergies:     Allergies: Clonidine, Lisinopril, Furosemide, Oxycodone, Amlodipine,  Chlorthalidone, Coreg [carvedilol], Diltiazem, Hydralazine, and Warfarin          Past Medical History:     Past Medical History:   Diagnosis Date     Acute kidney failure (H)     2012,Resolved after rehydration, lisinopril held and restarted with return to previous baseline creat 1.2     Acute tubulo-interstitial nephritis     2012,Resolved     Angina pectoris (H)     ,Positive exercise test at 8 minutes with double product of 25,000     Benign neoplasm of colon     No Comments Provided     Chronic kidney disease, stage III (moderate) (H)     No Comments Provided     Essential (primary) hypertension     No Comments Provided     Gout     No Comments Provided     Hyperlipidemia     No Comments Provided     Impaired fasting glucose     No Comments Provided     Obesity     No Comments Provided     Obesity     No Comments Provided     Other chest pain     2013     Sleep apnea     CPAP            Past Surgical History:     Past Surgical History:   Procedure Laterality Date     APPENDECTOMY OPEN      No Comments Provided     COLONOSCOPY      ,,F/U      EXTRACAPSULAR CATARACT EXTRATION WITH INTRAOCULAR LENS IMPLANT      No Comments Provided     OTHER SURGICAL HISTORY      10/8/13,805590,OTHER       ENT family history reviewed         Social History:     Social History     Tobacco Use     Smoking status: Former     Packs/day: 1.00     Years: 25.00     Pack years: 25.00     Types: Cigarettes     Quit date: 1990     Years since quittin.4     Smokeless tobacco: Never   Substance Use Topics     Alcohol use: Yes     Alcohol/week: 5.0 standard drinks of alcohol     Comment: Alcoholic Drinks/day: on occasion     Drug use: Unknown     Types: Other     Comment: Drug use: No            Review of Systems:     ROS: See HPI         Physical Exam:     BP (!) 182/80 (BP Location: Right arm, Patient Position: Sitting, Cuff Size: Adult Large)   Pulse 87   Temp 99.1  F (37.3  C) (Tympanic)   Wt  88.5 kg (195 lb)   SpO2 97%   BMI 31.47 kg/m      General - The patient is well nourished and well developed, and appears to have good nutritional status.    Head and Face - Normocephalic and atraumatic, with no gross asymmetry noted.  The facial nerve is intact, with strong symmetric movements.  Voice and Breathing - The patient was breathing comfortably without the use of accessory muscles. There was no wheezing, stridor, or stertor.  The patients voice was clear and strong, and had appropriate pitch and quality.  Ears -The external auditory canals are patent, the tympanic membranes are intact without effusion, retraction or mass.  Bony landmarks are intact.  Eyes - Extraocular movements intact, sclera were not icteric or injected, conjunctiva were pink and moist.  Mouth - Examination of the oral cavity showed pink, healthy oral mucosa. No lesions or ulcerations noted.  The tongue was mobile and midline, and the dentition were in good repair.    Throat - The walls of the oropharynx were smooth, pink, moist, symmetric, and had no lesions or ulcerations.  The tonsillar pillars and soft palate were symmetric.  The uvula was midline on elevation. Neck - No worrisome lymphadenopathy.   Palpation of the thyroid was soft and smooth, with no nodules or goiter appreciated.  The trachea was mobile and midline.  Nose - External contour is symmetric, no gross deflection or scars.  The nasal passages are examined with nasal speculum.   Right nasal septum/columella is erythematous, edematous, fluctuant with some surrounding induration.  The remaining nasal mucosa is pink and moist with no abnormal mucus.  The septum was intact and the turbinates are examined with nasal speculum, no polyps, masses, or purulence noted on examination of anterior nasal cavity.   Skin -papular rash noted on the flexor surfaces of the bilateral upper extremities, picture below.  She also a maculopapular rash band across the occiput.  No current  rash on the back.    Procedure - incision and drainage of right nasal septum/collumella abscess     Informed consent was discussed and signed, and risks of the procedure were discussed, including bleeding, anesthesia, infection, scar formation, numbness, need for additional surgery, injury to salivary tissue.  I then infiltrated the right anterior septal mucosal tissues with 1% lidocaine with 1:100,000 epinephrine.  I then used a  10 blade to create a stab incision into the abscess.  I drained a moderate amount of thick seropurulent drainage.   The area was irrigated with sterile saline.  No bleeding.  The wound was left open to finish draining         Assessment and Plan:       ICD-10-CM    1. Allergic rhinitis  J30.9 Inhalent Panel [place] ([employer])     Total IgE ([employer]olab)     Inhalent Panel [place] ([employer])     Total IgE ([employer]olab)      2. Allergic contact dermatitis, unspecified trigger  L23.9       3. Nasal septal abscess  J34.0         Advised Duane we do not have the capability to allergy test for medications.  We will test him for environmental allergies.  Recommend referral to allergist such as Dr. Frazier at [facility] or Dr Nelson and team at [facility] for consultation for further allergy symptoms.      Recommend restarting daily antihistamine, can double as needed for itching, irritating rash.  Avoid known irritants.  We will call with results of environmental panel    POST PROCEDURE INSTRUCTIONS        Wash incision with Gentle Cleanser Twice Daily (Cetaphil, Baby Shampoo, etc)    Apply warm compress daily for 2 weeks or until the lesion is healed    Cover with a clean dressing if in a dirty victorina environment or when wet/soiled    Keep incision clean and dry   Do NOT soak in water such as a tub bath or swimming   Do NOT put make-up, powders, hairspray, lotions, etc on the incision       You can apply ice to the surgical area to help reduce swelling. (no longer than 20 minutes at a  time)      You can use acetaminophen(Tylenol) or the prescription you received for pain.       If you have any bleeding, cover the wound with clean gauze and hold pressure for 10 Minutes. If the bleeding does not stop or is heavy and profuse, call the clinic or go to the Urgent Care/Emergency Department.    SIGNS OF INFECTION ARE:    Redness, swelling, red streaks, pus, drainage, warmth, fever, increased pain, foul smell.     Contact your primary health care provider if you notice any of the warning signs.          6 WEEKS POST PROCEDURE      Apply ANY type of lotion to the suture site(Example - Vaseline Intensive Care or Vitamin E)    Massage the surgical area 1-2 times daily in a circular motion for 5 minutes, for a period of 2 months. This will help the scar heal better.         Inna ROBISONC  [facility] ENT

## 2023-05-31 NOTE — LETTER
June 14, 2023      Duane H Telecky   [address] [place] [id number]        Dear ,    We are writing to inform you of your test results.    Test results indicate you may require additional follow up, see comment below.     Mild sensitivity to Aspergillus, otherwise all negative.  I do not believe this is what is causing his rash.  Certainly can consider follow-up with allergist.  Also recommend follow-up with Dermatology if rash persists.       If you have any questions or concerns, please call the clinic at the number listed above.       Sincerely,      Inna Chowdhury NP

## 2023-05-31 NOTE — LETTER
5/31/2023         RE: Duane H Telecky  [address]  [facility] [address]        Dear Colleague,    Thank you for referring your patient, Duane H Telecky, to the [facility]. Please see a copy of my visit note below.    Otolaryngology Note         Chief Complaint:     Patient presents with:  Consult For: Allergy            History of Present Illness:     Duane H Telecky is a 83 year old male seen today for concerns for a rash on his back.  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  He had watchman completed on 11/24/21.    He has a history of skin sensitivity, significant itchy rash on his back after starting clonidine.  This worsened after the clonidine dosing was increased.  He was following with his primary care provider.  Punch biopsy completed of the back shows contact allergic dermatitis.      The rash got better when the clonidine was decreased to 0.1 mg.    He was treated with prednisone with almost resolution of symptoms.  Once he stopped the prednisone the rash returned.      He now has a rash on the flexor portion of the arms bilaterally. This is not itchy.  This started in the last month or so.    He now has some irritation in the nose that started Sunday/Monday.  Feels he has a ingrown hair or abscess on the columella.    He is able to breath through his nose well.  He wears CPAP mask to sleep, has a rash on the skin where he wears the mask    Hairline itches.      He is not currently on Clonidine, stopped 8.5 weeks ago.  He has a rash on head, arms, legs.  The rash on his back has significantly improved    He was placed on zyrtec and xyzal with minimal improvement.  Itching overall has improved.  He has been off the cetrizine and xyzal x 3 days in anticipation of allergy testing.  He has not noted a significant change in itching since stopping.      He reports a history of a similar itchy rash in the distant  "past, treated with \"2 shots in the butt\" and had resolution.    Associated allergy symptoms include eye itching, eye watering, clear rhinorrhea, no frequent sneezing.      No chronic cough  No wheezing or shortness of breath    No concerns for food allergies.    Somewhat of a poor historian, difficult to obtain rash history, tangential         Medications:     Current Outpatient Rx   Medication Sig Dispense Refill     allopurinol (ZYLOPRIM) 300 MG tablet        amiodarone (PACERONE) 200 MG tablet Take 1 tablet by mouth daily       amLODIPine (NORVASC) 10 MG tablet Take 10 mg by mouth daily       aspirin (ASA) 81 MG chewable tablet Take 81 mg by mouth daily Chew and swallow, take with food       atorvastatin (LIPITOR) 40 MG tablet Take 40 mg by mouth At Bedtime       blood glucose (ACCU-CHEK MAIDA PLUS) test strip USE TO CHECK BLOOD SUGARS ONCE DAILY OR AS DIRECTED, ICD-10-CM: E11.9, NON INSULIN DEPENDENT.       blood glucose monitoring (SOFTCLIX) lancets USE TO CHECK BLOOD SUGARS ONCE DAILY AS DIRECTED       carvedilol (COREG) 25 MG tablet Take 1.5 tablets by mouth 2 times daily       cetirizine (ZYRTEC) 10 MG tablet Take 10 mg by mouth daily       clopidogrel (PLAVIX) 75 MG tablet Take 75 mg by mouth daily       furosemide (LASIX) 20 MG tablet Take 20 mg by mouth Use as directed by your prescriber. Complete directions are included in a letter with your original order.       magnesium oxide (MAG-OX) 400 (241.3 Mg) MG tablet Take 400 mg by mouth 2 times daily       metoprolol tartrate (LOPRESSOR) 25 MG tablet        nitroGLYcerin (NITROSTAT) 0.4 MG sublingual tablet Place 0.4 mg under the tongue every 5 minutes as needed for chest pain Do not crush; maximum of 3 doses in 15 minutes       ondansetron (ZOFRAN) 4 MG tablet Take 4 mg by mouth every 12 hours as needed for nausea       potassium chloride ER (K-TAB/KLOR-CON) 10 MEQ CR tablet Take 10 mEq by mouth daily       rivaroxaban ANTICOAGULANT (XARELTO) 15 MG TABS " tablet Take 1 tablet by mouth every morning with breakfast       Vitamin D, Cholecalciferol, 25 MCG (1000 UT) CAPS Take 2 capsules by mouth daily              Allergies:     Allergies: Clonidine, Lisinopril, Furosemide, Oxycodone, Amlodipine, Chlorthalidone, Coreg [carvedilol], Diltiazem, Hydralazine, and Warfarin          Past Medical History:     Past Medical History:   Diagnosis Date     Acute kidney failure (H)     2012,Resolved after rehydration, lisinopril held and restarted with return to previous baseline creat 1.2     Acute tubulo-interstitial nephritis     2012,Resolved     Angina pectoris (H)     ,Positive exercise test at 8 minutes with double product of 25,000     Benign neoplasm of colon     No Comments Provided     Chronic kidney disease, stage III (moderate) (H)     No Comments Provided     Essential (primary) hypertension     No Comments Provided     Gout     No Comments Provided     Hyperlipidemia     No Comments Provided     Impaired fasting glucose     No Comments Provided     Obesity     No Comments Provided     Obesity     No Comments Provided     Other chest pain     2013     Sleep apnea     CPAP            Past Surgical History:     Past Surgical History:   Procedure Laterality Date     APPENDECTOMY OPEN      No Comments Provided     COLONOSCOPY      ,,F/U      EXTRACAPSULAR CATARACT EXTRATION WITH INTRAOCULAR LENS IMPLANT      No Comments Provided     OTHER SURGICAL HISTORY      10/8/13,651275,OTHER       ENT family history reviewed         Social History:     Social History     Tobacco Use     Smoking status: Former     Packs/day: 1.00     Years: 25.00     Pack years: 25.00     Types: Cigarettes     Quit date: 1990     Years since quittin.4     Smokeless tobacco: Never   Substance Use Topics     Alcohol use: Yes     Alcohol/week: 5.0 standard drinks of alcohol     Comment: Alcoholic Drinks/day: on occasion     Drug use: Unknown     Types: Other      Comment: Drug use: No            Review of Systems:     ROS: See HPI         Physical Exam:     BP (!) 182/80 (BP Location: Right arm, Patient Position: Sitting, Cuff Size: Adult Large)   Pulse 87   Temp 99.1  F (37.3  C) (Tympanic)   Wt 88.5 kg (195 lb)   SpO2 97%   BMI 31.47 kg/m      General - The patient is well nourished and well developed, and appears to have good nutritional status.    Head and Face - Normocephalic and atraumatic, with no gross asymmetry noted.  The facial nerve is intact, with strong symmetric movements.  Voice and Breathing - The patient was breathing comfortably without the use of accessory muscles. There was no wheezing, stridor, or stertor.  The patients voice was clear and strong, and had appropriate pitch and quality.  Ears -The external auditory canals are patent, the tympanic membranes are intact without effusion, retraction or mass.  Bony landmarks are intact.  Eyes - Extraocular movements intact, sclera were not icteric or injected, conjunctiva were pink and moist.  Mouth - Examination of the oral cavity showed pink, healthy oral mucosa. No lesions or ulcerations noted.  The tongue was mobile and midline, and the dentition were in good repair.    Throat - The walls of the oropharynx were smooth, pink, moist, symmetric, and had no lesions or ulcerations.  The tonsillar pillars and soft palate were symmetric.  The uvula was midline on elevation. Neck - No worrisome lymphadenopathy.   Palpation of the thyroid was soft and smooth, with no nodules or goiter appreciated.  The trachea was mobile and midline.  Nose - External contour is symmetric, no gross deflection or scars.  The nasal passages are examined with nasal speculum.   Right nasal septum/columella is erythematous, edematous, fluctuant with some surrounding induration.  The remaining nasal mucosa is pink and moist with no abnormal mucus.  The septum was intact and the turbinates are examined with nasal speculum, no polyps,  masses, or purulence noted on examination of anterior nasal cavity.   Skin -papular rash noted on the flexor surfaces of the bilateral upper extremities, picture below.  She also a maculopapular rash band across the occiput.  No current rash on the back.    Procedure - incision and drainage of right nasal septum/collumella abscess     Informed consent was discussed and signed, and risks of the procedure were discussed, including bleeding, anesthesia, infection, scar formation, numbness, need for additional surgery, injury to salivary tissue.  I then infiltrated the right anterior septal mucosal tissues with 1% lidocaine with 1:100,000 epinephrine.  I then used a  10 blade to create a stab incision into the abscess.  I drained a moderate amount of thick seropurulent drainage.   The area was irrigated with sterile saline.  No bleeding.  The wound was left open to finish draining         Assessment and Plan:       ICD-10-CM    1. Allergic rhinitis  J30.9 Inhalent Panel [place] (Serolab)     Total IgE (Serolab)     Inhalent Panel [place] (Serolab)     Total IgE (Serolab)      2. Allergic contact dermatitis, unspecified trigger  L23.9       3. Nasal septal abscess  J34.0         Advised Duane we do not have the capability to allergy test for medications.  We will test him for environmental allergies.  Recommend referral to allergist such as Dr. Frazier at [facility] or Dr Nelson and team at [facility] for consultation for further allergy symptoms.      Recommend restarting daily antihistamine, can double as needed for itching, irritating rash.  Avoid known irritants.  We will call with results of environmental panel    POST PROCEDURE INSTRUCTIONS        Wash incision with Gentle Cleanser Twice Daily (Cetaphil, Baby Shampoo, etc)    Apply warm compress daily for 2 weeks or until the lesion is healed    Cover with a clean dressing if in a dirty victorina environment or when wet/soiled    Keep incision clean and  dry   Do NOT soak in water such as a tub bath or swimming   Do NOT put make-up, powders, hairspray, lotions, etc on the incision       You can apply ice to the surgical area to help reduce swelling. (no longer than 20 minutes at a time)      You can use acetaminophen(Tylenol) or the prescription you received for pain.       If you have any bleeding, cover the wound with clean gauze and hold pressure for 10 Minutes. If the bleeding does not stop or is heavy and profuse, call the clinic or go to the Urgent Care/Emergency Department.    SIGNS OF INFECTION ARE:    Redness, swelling, red streaks, pus, drainage, warmth, fever, increased pain, foul smell.     Contact your primary health care provider if you notice any of the warning signs.          6 WEEKS POST PROCEDURE      Apply ANY type of lotion to the suture site(Example - Vaseline Intensive Care or Vitamin E)    Massage the surgical area 1-2 times daily in a circular motion for 5 minutes, for a period of 2 months. This will help the scar heal better.         Inna FIORE  [facility]          Again, thank you for allowing me to participate in the care of your patient.        Sincerely,        Inna Chowdhury NP

## 2023-06-13 LAB
SCANNED LAB RESULT: NORMAL
SCANNED LAB RESULT: NORMAL

## 2023-06-14 NOTE — RESULT ENCOUNTER NOTE
Mild sensitivity to Aspergillus, otherwise all negative.  I do not believe this is what is causing his rash.  Certainly can consider follow-up with allergist.  Also recommend follow-up with Derm if rash persists.  JS

## 2024-01-08 ENCOUNTER — HOSPITAL ENCOUNTER (OUTPATIENT)
Dept: MRI IMAGING | Facility: OTHER | Age: 84
Discharge: HOME | End: 2024-01-08
Attending: NURSE PRACTITIONER | Admitting: NURSE PRACTITIONER
Payer: MEDICARE

## 2024-01-08 DIAGNOSIS — G89.29 CHRONIC MIDLINE LOW BACK PAIN WITHOUT SCIATICA: ICD-10-CM

## 2024-01-08 DIAGNOSIS — M54.50 CHRONIC MIDLINE LOW BACK PAIN WITHOUT SCIATICA: ICD-10-CM

## 2024-01-08 PROCEDURE — 72148 MRI LUMBAR SPINE W/O DYE: CPT

## 2024-01-30 ENCOUNTER — TELEPHONE (OUTPATIENT)
Dept: GENERAL RADIOLOGY | Facility: OTHER | Age: 84
End: 2024-01-30
Payer: COMMERCIAL

## 2024-01-30 DIAGNOSIS — Z79.01 LONG TERM CURRENT USE OF ANTICOAGULANT THERAPY: Primary | ICD-10-CM

## 2024-01-30 NOTE — TELEPHONE ENCOUNTER
"Patient was seen 1/29/24 for pre-op appt with Dr. Owusu at [facility].    Allergies and Drug Sensitivities:   Allergies   Allergen Reactions    Lisinopril   angioedema    Clonidine RASH    Coreg [Carvedilol] RASH    Diltiazem RASH    Hydralazine RASH    Lasix [Furosemide] RASH   Rash and itchy    Oxycodone Hallucinations   \"I could see people trying to kill me\"    Warfarin RASH   Rash resolved after stopping warfarin    Xarelto [Rivaroxaban] RASH    Chlorthalidone   Dose over 12.5 mg daily causes creatinine to increase    Norvasc [Amlodipine] Other   Other reaction(s): *Unknown     Current Medications:  Current Outpatient Medications   Medication Sig    verapamil CR (Calan SR) 120 MG tablet Take 1 Tablet by mouth one time a day. Administer with food. Do not crush.    ciclopirox (Loprox) 0.77 % cream Apply to affected areas of the feet twice daily    atorvaSTATin (Lipitor) 40 MG tablet TAKE 1 TABLET AT BEDTIME    torsemide (Demadex) 10 MG tablet Take 1 Tablet by mouth one time a day.    doxazosin (Cardura) 4 MG tablet Take 1.5 Tablets by mouth at bedtime.    levocetirizine (Xyzal) 5 MG tablet Take 0.5 (one-half) to 1 Tablet by mouth every evening.    allopurinol (Zyloprim) 300 MG tablet Take 0.5 Tabs by mouth one time a day.    triamcinolone acetonide (Kenalog) 0.5 % cream Apply topically two times a day. Apply to affected area: rash to arms and back    hydrocortisone 1 % cream Apply topically two times a day. Apply to affected area: rash on body    cetirizine (ZyrTEC) 10 MG tablet Take 1 Tablet by mouth every morning.    potassium chloride CR (K-Dur, Klor-Con M) 10 MEQ tablet TAKE 1 TABLET EVERY DAY. DO NOT CRUSH    nitroglycerin (Nitrostat) 0.4 MG sublingual tablet Place 1 Tablet under the tongue every five minutes as needed for Chest pain. Do not crush; maximum of 3 doses in 15 minutes.    ciclopirox (Loprox) 0.77 % cream Apply to affected areas of the feet twice daily    ondansetron (Zofran) 4 MG tablet " TAKE 1 TABLET EVERY 12 HOURS AS NEEDED FOR NAUSEA.    magnesium oxide 400 (240 Mg) MG Tablet Take 1 Tablet by mouth two times a day.    Microlet Lancets Misc USE TO CHECK BLOOD SUGARS ONCE DAILY AS DIRECTED    Contour Next Test USE TO CHECK BLOOD SUGARS ONCE DAILY OR AS DIRECTED, ICD-10-CM: E11.9, NON INSULIN DEPENDENT.    Blood Glucose Monitoring Suppl (Contour Next EZ) w/Device Kit Use as directed.    aspirin EC 81 MG tablet Take 1 Tablet by mouth one time a day. Do not split or crush.    cholecalciferol (Vitamin D3) 1000 UNIT capsule Take 2 Capsules by mouth one time a day. 1 unit of Vitamin D equals 0.025 mcg of Vitamin D       Any anticoagulants or blood thinners? Yes, Aspirin 81 mg, hold x 3 days per [facility] guidelines.     Any insulin or oral antihyperglycemic meds? No.     Any injectable medications such as ozempic, victoza, saxenda, wegovy, byetta, bydureon, trulicity, rybelsus, liraglutide, semaglutide, dulaglutide? No.     Are you being treated for an infection (on antibiotics)? No.     For rhizotomy only: Have you had any surgery since your last rhizotomy? No     Procedure name: right lumbar rhizotomy, Dr. Hester    Procedure date verified: Yes, 2/6/24.  Arrival time verified: 0830 AM Please disregard any automatic or other arrival time instructions. This is the accurate time for arrival.  Facility location verified: [address] [place] [address]. Instructed to enter through main clinic entrance, veer to the right, go down long carpeted hallway, and proceed to Diagnostic Registration.  Plan to be here for approximately 3-3.5 hours. Explain current visitor policy to patient.    COVID test: no longer required for outpatient procedures.    Pre-procedure optimization completed within 30 days. Patient will need labs drawn day of procedure. Lab orders placed and requested lab appt at time of arrival on day of procedure.     Any mention of previous ASA class in chart (use search tool)? 4 - Severe  systemic disease that is a constant threat to life (if no mention of ASA AND there are co-morbidities, call [phone] for CRNA chart review.  HARD STOP FOR ASA IV, or V. No moderate sedation outside of OR for ASA IV, or V. Inform Dr. Hester of ASA III, IV, or V at time of this note (may use narcotic OR sedative independently). Radiologist or nurse can consult with CRNA if any questions regarding ASA status.     1. Have you ever had a heart attack or stroke? S/P CABG, hx ASCVD, Watchman left atrial appendage closure device, elevated troponin  2. Have you ever had surgery on your heart or blood vessels, such as a stent placement, a coronary artery bypass, or surgery on an artery in your head, neck, heart, or legs? yes  3. Do you have chest pain with activity? Has nitroglycerine on chart  4. Do you have a history of heart failure? yes  5. Do you currently have a cold, bronchitis or symptoms of other infection? no  6. Do you have a asthma, a cough, shortness of breath, or wheezing? no  7. Do you or does anyone in your family have a serious bleeding problem such as prolonged bleeding following surgeries or cuts? no  8. Have you or any of your relatives ever had problems with anesthesia? no  9. Do you have sleep apnea, excessive snoring or daytime drowsiness? Yes, uses CPAP  10. Do you have any artifical heart valves or other implanted medical devices like a stents, pacemaker, defibrillator, or continuous glucose monitor? yes  11. Do you have artificial joints? no  12. Have you ever been intubated or had respiratory failure? No, not emergently, only intubated during previous surgeries as planned  13. Are you diabetic? Yes, does not use insulin  14. Do you have high blood pressure? yes  15. How many beverages containing alcohol do you have daily? 2 drinks per week  16. Are you on dialysis? no  17. Do you have dentures? no  18. Have you ever had surgery on your neck? no  19. Do you take pain pills? no If yes, how many do  "you take each day? 0    CRNA consulted? No    Will moderate sedation be used for this case? No, last rhizo used Fentanyl only.    CPAP machine use: Yes (If yes, instructed to bring CPAP to all procedures using moderate sedation)    No solids after MN AM. (6 hours prior to start of procedure.)  No liquids after 0630 AM. (3 hours prior to start of procedure.)    Transportation after procedure: Yes:  is \"[employer]\".    Someone must stay with the patient for 24 hours.     Any questions or patient concerns? Yes: all questions answered.     Provide patient with DI nurse number [phone].    "

## 2024-02-06 ENCOUNTER — LAB (OUTPATIENT)
Dept: LAB | Facility: OTHER | Age: 84
End: 2024-02-06
Payer: MEDICARE

## 2024-02-06 ENCOUNTER — HOSPITAL ENCOUNTER (OUTPATIENT)
Dept: GENERAL RADIOLOGY | Facility: OTHER | Age: 84
Discharge: HOME | End: 2024-02-06
Attending: NURSE PRACTITIONER
Payer: MEDICARE

## 2024-02-06 VITALS
DIASTOLIC BLOOD PRESSURE: 74 MMHG | HEART RATE: 69 BPM | OXYGEN SATURATION: 92 % | TEMPERATURE: 98.5 F | SYSTOLIC BLOOD PRESSURE: 129 MMHG | RESPIRATION RATE: 18 BRPM

## 2024-02-06 DIAGNOSIS — M54.50 CHRONIC RIGHT-SIDED LOW BACK PAIN WITHOUT SCIATICA: ICD-10-CM

## 2024-02-06 DIAGNOSIS — M47.816 LUMBAR FACET ARTHROPATHY: ICD-10-CM

## 2024-02-06 DIAGNOSIS — G89.29 CHRONIC RIGHT-SIDED LOW BACK PAIN WITHOUT SCIATICA: ICD-10-CM

## 2024-02-06 DIAGNOSIS — Z79.01 LONG TERM CURRENT USE OF ANTICOAGULANT THERAPY: ICD-10-CM

## 2024-02-06 LAB
INR PPP: 0.94 (ref 0.85–1.15)
PLATELET # BLD AUTO: 158 10E3/UL (ref 150–450)

## 2024-02-06 PROCEDURE — 250N000009 HC RX 250: Performed by: RADIOLOGY

## 2024-02-06 PROCEDURE — 36415 COLL VENOUS BLD VENIPUNCTURE: CPT | Mod: ZL

## 2024-02-06 PROCEDURE — 250N000011 HC RX IP 250 OP 636: Performed by: RADIOLOGY

## 2024-02-06 PROCEDURE — 258N000003 HC RX IP 258 OP 636: Performed by: RADIOLOGY

## 2024-02-06 PROCEDURE — 85049 AUTOMATED PLATELET COUNT: CPT | Mod: ZL

## 2024-02-06 PROCEDURE — 85610 PROTHROMBIN TIME: CPT | Mod: ZL

## 2024-02-06 PROCEDURE — 64636 DESTROY L/S FACET JNT ADDL: CPT

## 2024-02-06 RX ORDER — NALOXONE HYDROCHLORIDE 0.4 MG/ML
0.4 INJECTION, SOLUTION INTRAMUSCULAR; INTRAVENOUS; SUBCUTANEOUS
Status: DISCONTINUED | OUTPATIENT
Start: 2024-02-06 | End: 2024-02-07 | Stop reason: HOSPADM

## 2024-02-06 RX ORDER — NALOXONE HYDROCHLORIDE 0.4 MG/ML
0.2 INJECTION, SOLUTION INTRAMUSCULAR; INTRAVENOUS; SUBCUTANEOUS
Status: DISCONTINUED | OUTPATIENT
Start: 2024-02-06 | End: 2024-02-07 | Stop reason: HOSPADM

## 2024-02-06 RX ORDER — LIDOCAINE HYDROCHLORIDE 10 MG/ML
4 INJECTION, SOLUTION INFILTRATION; PERINEURAL ONCE
Status: COMPLETED | OUTPATIENT
Start: 2024-02-06 | End: 2024-02-06

## 2024-02-06 RX ORDER — CICLOPIROX OLAMINE 7.7 MG/G
CREAM TOPICAL 2 TIMES DAILY
COMMUNITY

## 2024-02-06 RX ORDER — LEVOCETIRIZINE DIHYDROCHLORIDE 5 MG/1
2.5-5 TABLET, FILM COATED ORAL EVERY EVENING
COMMUNITY
Start: 2023-08-24

## 2024-02-06 RX ORDER — DOXAZOSIN 4 MG/1
6 TABLET ORAL AT BEDTIME
COMMUNITY
Start: 2023-11-13

## 2024-02-06 RX ORDER — SODIUM CHLORIDE 9 MG/ML
INJECTION, SOLUTION INTRAVENOUS CONTINUOUS
Status: DISCONTINUED | OUTPATIENT
Start: 2024-02-06 | End: 2024-02-07 | Stop reason: HOSPADM

## 2024-02-06 RX ORDER — TORSEMIDE 10 MG/1
10 TABLET ORAL DAILY
COMMUNITY
Start: 2023-11-16

## 2024-02-06 RX ORDER — LIDOCAINE 40 MG/G
CREAM TOPICAL
Status: DISCONTINUED | OUTPATIENT
Start: 2024-02-06 | End: 2024-02-07 | Stop reason: HOSPADM

## 2024-02-06 RX ORDER — FENTANYL CITRATE 50 UG/ML
25-50 INJECTION, SOLUTION INTRAMUSCULAR; INTRAVENOUS EVERY 5 MIN PRN
Status: DISCONTINUED | OUTPATIENT
Start: 2024-02-06 | End: 2024-02-07 | Stop reason: HOSPADM

## 2024-02-06 RX ORDER — BUPIVACAINE HYDROCHLORIDE 5 MG/ML
6 INJECTION, SOLUTION EPIDURAL; INTRACAUDAL ONCE
Status: COMPLETED | OUTPATIENT
Start: 2024-02-06 | End: 2024-02-06

## 2024-02-06 RX ORDER — DEXAMETHASONE SODIUM PHOSPHATE 10 MG/ML
10 INJECTION, SOLUTION INTRAMUSCULAR; INTRAVENOUS ONCE
Status: COMPLETED | OUTPATIENT
Start: 2024-02-06 | End: 2024-02-06

## 2024-02-06 RX ADMIN — FENTANYL CITRATE 25 MCG: 50 INJECTION INTRAMUSCULAR; INTRAVENOUS at 09:33

## 2024-02-06 RX ADMIN — LIDOCAINE HYDROCHLORIDE 4 ML: 10 INJECTION, SOLUTION INFILTRATION; PERINEURAL at 10:07

## 2024-02-06 RX ADMIN — SODIUM CHLORIDE: 9 INJECTION, SOLUTION INTRAVENOUS at 09:04

## 2024-02-06 RX ADMIN — FENTANYL CITRATE 25 MCG: 50 INJECTION INTRAMUSCULAR; INTRAVENOUS at 09:38

## 2024-02-06 RX ADMIN — FENTANYL CITRATE 25 MCG: 50 INJECTION INTRAMUSCULAR; INTRAVENOUS at 09:35

## 2024-02-06 RX ADMIN — FENTANYL CITRATE 25 MCG: 50 INJECTION INTRAMUSCULAR; INTRAVENOUS at 09:40

## 2024-02-06 RX ADMIN — BUPIVACAINE HYDROCHLORIDE 20 MG: 5 INJECTION, SOLUTION EPIDURAL; INTRACAUDAL; PERINEURAL at 10:06

## 2024-02-06 RX ADMIN — DEXAMETHASONE SODIUM PHOSPHATE 10 MG: 10 INJECTION INTRAMUSCULAR; INTRAVENOUS at 10:07

## 2024-02-06 NOTE — DISCHARGE INSTRUCTIONS
" Radiofrequency Ablation - Rhizotomy    Radiofrequency (RF) rhizotomy, or radiofrequency ablation is a therapeutic procedure designed to decrease or eliminate nerve pain symptoms that have not responded to more conservative pain treatments. The procedure uses highly localized heat generated with radiofrequency to damage the nerves causing the pain. Damaging these nerves prevents pain signals from being transmitted from the spine to the brain. In other words, the procedure should \"turn down\" the intensity of the pain. A successful procedure reduces pain without reducing nerve function.    Preliminary Testing    Before an RF rhizotomy exam can be scheduled, it is important for our radiologists to identify where the pain originates. Exams required to determine these areas might include MRI of the spine, nerve block injections with local anesthetic (numbing medication) and steroids (anti-inflammatory medication), and possibly discography.    What to Expect AFTER    When your procedure is complete, you ll be escorted back to your room so you can change out of the gown and back into your clothing.  If you are sedated, a nurse will review some guidelines that you will be asked to follow post-procedure, such as driving, drinking alcoholic beverages, etc.  You may experience numbness and/or relief from symptoms after the procedure due to the anesthetic.  Once the local anesthetic effects have worn off, your usual symptoms may return and may be more severe for up to seven days after the procedure.  Improvement occurs typically about two to three weeks after the procedure. However, it may take up to six weeks before symptoms improve and the beneficial effects of the rhizotomy are realized. Every patient is different and your outcome may vary. A sunburn-like discomfort is common, and icing the area may help with that. It should gradually go away. Take your usual pain medications if you need to.   It is sometimes helpful to keep " a record of your daily pain level to notice better the relief you may be experiencing.    [facility] Procedure   Adult Discharge Orders & Instructions      For 24 hours after surgery:  Get plenty of rest.  A responsible adult must stay with you for at least 24 hours after you leave the hospital.   You may feel lightheaded.  IF so, sit for a few minutes before standing.  Have someone help you get up.   You may have a slight fever. Call the doctor if your fever is over 101 F (38.3 C) (taken under the tongue) or lasts longer than 24 hours.  You may have a dry mouth, a sore throat, muscle aches or trouble sleeping.  These should go away after 24 hours.  Do not make important or legal decisions.  6.   Do not drive or use heavy equipment.  If you have weakness or tingling, don't drive or use heavy equipment until this feeling goes away.    To reach a doctor, please call [phone] and tell the person who answers that you had a rhizotomy by Dr. Hester on 02/06/2024.     OK to restart aspirin tomorrow

## 2024-02-06 NOTE — PROGRESS NOTES
"   Allergies   Allergen Reactions    Clonidine Shortness Of Breath, Dermatitis, Hives, Itching and Rash    Lisinopril      angioedema    Furosemide Rash     Rash and itchy    Oxycodone Visual Disturbance     \"I could see people trying to kill me\"    Rivaroxaban Rash    Amlodipine Unknown    Chlorthalidone      Dose over 12.5 mg daily causes creatinine to increase    Coreg [Carvedilol] Rash    Diltiazem Rash    Hydralazine Rash    Warfarin Rash     Rash resolved after stopping warfarin       Current Outpatient Medications   Medication    ciclopirox (LOPROX) 0.77 % cream    doxazosin (CARDURA) 4 MG tablet    levocetirizine (XYZAL) 5 MG tablet    torsemide (DEMADEX) 10 MG tablet    allopurinol (ZYLOPRIM) 300 MG tablet    aspirin (ASA) 81 MG chewable tablet    atorvastatin (LIPITOR) 40 MG tablet    blood glucose (ACCU-CHEK MAIDA PLUS) test strip    blood glucose monitoring (SOFTCLIX) lancets    cetirizine (ZYRTEC) 10 MG tablet    magnesium oxide (MAG-OX) 400 (241.3 Mg) MG tablet    nitroGLYcerin (NITROSTAT) 0.4 MG sublingual tablet    ondansetron (ZOFRAN) 4 MG tablet    potassium chloride ER (K-TAB/KLOR-CON) 10 MEQ CR tablet    Vitamin D, Cholecalciferol, 25 MCG (1000 UT) CAPS     Current Facility-Administered Medications   Medication    fentaNYL (PF) (SUBLIMAZE) injection 25-50 mcg    lidocaine (LMX4) cream    lidocaine 1 % 0.1-1 mL    naloxone (NARCAN) injection 0.2 mg    Or    naloxone (NARCAN) injection 0.4 mg    Or    naloxone (NARCAN) injection 0.2 mg    Or    naloxone (NARCAN) injection 0.4 mg    sodium chloride (PF) 0.9% PF flush 3 mL    sodium chloride (PF) 0.9% PF flush 3 mL    sodium chloride 0.9 % infusion       Post Procedure Summary:    Prior to the start of the procedure, with procedural staff and physician participation, I verbally confirmed the patient s identity using two indicators, relevant allergies, that the procedure was appropriate and matched the consent or emergent situation, and that the " "correct equipment/implants were available. Immediately prior to starting the procedure I conducted the Time Out with the procedural staff and re-confirmed the patient s name, procedure, and site/side. (The Joint Commission universal protocol was followed.)  Yes       Sedatives: Fentanyl 100 mcg.      Reversals given: None (If reversals given, must recover for at least 2 hours)    Vital signs, airway and pulse oximetry, capnography, and cardiac rhythm were monitored and remained stable throughout the procedure and sedation was maintained until the procedure was complete.  The patient was monitored by staff until sedation discharge criteria were met.    Patient tolerance: Patient tolerated the procedure well with no immediate complications.    Start Time: 0931  End Time: 0944  Time of procedure in minutes: 13 minutes from beginning to end of physician one to one monitoring. No moderate sedation given, only fentanyl was used    Band-aids x 3.  CDI.  Conductive pad site located at right thigh.  Skin intact.      Duane will be discharged via ride by  his friend, \"Apolonia Chiang\" .     Patient and person driving patient home verbalized understanding of discharge instructions and recommended follow up care as noted on discharge instructions.  Written discharge instructions given, denies any further questions. Belongings sent with patient.     Pain Level at discharge: Pt states, \"my back is a little sore, like at a 1. The tingling in my feet is gone.\"     Danielle Mckeon RN on 2/6/2024 at 11:01 AM        "

## 2025-01-01 ENCOUNTER — APPOINTMENT (OUTPATIENT)
Dept: GENERAL RADIOLOGY | Facility: OTHER | Age: 85
End: 2025-01-01
Attending: PHYSICIAN ASSISTANT
Payer: MEDICARE

## 2025-01-01 ENCOUNTER — HOSPITAL ENCOUNTER (INPATIENT)
Facility: OTHER | Age: 85
DRG: 280 | End: 2025-01-01
Attending: PHYSICIAN ASSISTANT | Admitting: FAMILY MEDICINE
Payer: MEDICARE

## 2025-01-01 DIAGNOSIS — I50.33 ACUTE ON CHRONIC HEART FAILURE WITH PRESERVED EJECTION FRACTION (HFPEF) (H): ICD-10-CM

## 2025-01-01 DIAGNOSIS — I50.9 ACUTE ON CHRONIC CONGESTIVE HEART FAILURE, UNSPECIFIED HEART FAILURE TYPE (H): ICD-10-CM

## 2025-01-01 DIAGNOSIS — Z95.1 POSTSURGICAL AORTOCORONARY BYPASS STATUS: Primary | ICD-10-CM

## 2025-01-01 PROBLEM — I10 PRIMARY HYPERTENSION: Status: ACTIVE | Noted: 2018-03-08

## 2025-01-01 PROBLEM — I48.0 PAROXYSMAL ATRIAL FIBRILLATION (H): Status: ACTIVE | Noted: 2021-01-26

## 2025-01-01 PROBLEM — Z95.818 PRESENCE OF WATCHMAN LEFT ATRIAL APPENDAGE CLOSURE DEVICE: Status: ACTIVE | Noted: 2021-12-03

## 2025-01-01 PROBLEM — D50.9 IRON DEFICIENCY ANEMIA: Status: ACTIVE | Noted: 2021-05-22

## 2025-01-01 PROBLEM — N18.4 CKD (CHRONIC KIDNEY DISEASE) STAGE 4, GFR 15-29 ML/MIN (H): Status: ACTIVE | Noted: 2017-08-07

## 2025-01-01 PROBLEM — R79.89 ELEVATED TROPONIN LEVEL: Status: ACTIVE | Noted: 2025-01-01

## 2025-01-01 LAB
ALBUMIN SERPL BCG-MCNC: 3.8 G/DL (ref 3.5–5.2)
ALP SERPL-CCNC: 188 U/L (ref 40–150)
ALT SERPL W P-5'-P-CCNC: <5 U/L (ref 0–70)
ANION GAP SERPL CALCULATED.3IONS-SCNC: 16 MMOL/L (ref 7–15)
APTT PPP: 29 SECONDS (ref 22–38)
AST SERPL W P-5'-P-CCNC: 12 U/L (ref 0–45)
BASOPHILS # BLD AUTO: 0 10E3/UL (ref 0–0.2)
BASOPHILS NFR BLD AUTO: 1 %
BILIRUB SERPL-MCNC: 0.5 MG/DL
BUN SERPL-MCNC: 41.5 MG/DL (ref 8–23)
CALCIUM SERPL-MCNC: 8.7 MG/DL (ref 8.8–10.4)
CHLORIDE SERPL-SCNC: 97 MMOL/L (ref 98–107)
CREAT SERPL-MCNC: 2.89 MG/DL (ref 0.67–1.17)
EGFRCR SERPLBLD CKD-EPI 2021: 21 ML/MIN/1.73M2
EOSINOPHIL # BLD AUTO: 0.2 10E3/UL (ref 0–0.7)
EOSINOPHIL NFR BLD AUTO: 3 %
ERYTHROCYTE [DISTWIDTH] IN BLOOD BY AUTOMATED COUNT: 15.5 % (ref 10–15)
EST. AVERAGE GLUCOSE BLD GHB EST-MCNC: 146 MG/DL
GLUCOSE BLDC GLUCOMTR-MCNC: 111 MG/DL (ref 70–99)
GLUCOSE BLDC GLUCOMTR-MCNC: 121 MG/DL (ref 70–99)
GLUCOSE SERPL-MCNC: 134 MG/DL (ref 70–99)
HBA1C MFR BLD: 6.7 %
HCO3 SERPL-SCNC: 23 MMOL/L (ref 22–29)
HCT VFR BLD AUTO: 32.2 % (ref 40–53)
HGB BLD-MCNC: 10.4 G/DL (ref 13.3–17.7)
HOLD SPECIMEN: NORMAL
IMM GRANULOCYTES # BLD: 0 10E3/UL
IMM GRANULOCYTES NFR BLD: 0 %
INR PPP: 0.94 (ref 0.85–1.15)
LYMPHOCYTES # BLD AUTO: 1 10E3/UL (ref 0.8–5.3)
LYMPHOCYTES NFR BLD AUTO: 13 %
MAGNESIUM SERPL-MCNC: 2.5 MG/DL (ref 1.7–2.3)
MCH RBC QN AUTO: 31.3 PG (ref 26.5–33)
MCHC RBC AUTO-ENTMCNC: 32.3 G/DL (ref 31.5–36.5)
MCV RBC AUTO: 97 FL (ref 78–100)
MONOCYTES # BLD AUTO: 0.5 10E3/UL (ref 0–1.3)
MONOCYTES NFR BLD AUTO: 7 %
NEUTROPHILS # BLD AUTO: 5.4 10E3/UL (ref 1.6–8.3)
NEUTROPHILS NFR BLD AUTO: 76 %
NRBC # BLD AUTO: 0 10E3/UL
NRBC BLD AUTO-RTO: 0 /100
NT-PROBNP SERPL-MCNC: 4226 PG/ML (ref 0–1800)
PLATELET # BLD AUTO: 132 10E3/UL (ref 150–450)
POTASSIUM SERPL-SCNC: 4.7 MMOL/L (ref 3.4–5.3)
PROT SERPL-MCNC: 6.3 G/DL (ref 6.4–8.3)
RBC # BLD AUTO: 3.32 10E6/UL (ref 4.4–5.9)
SODIUM SERPL-SCNC: 136 MMOL/L (ref 135–145)
TROPONIN T SERPL HS-MCNC: 61 NG/L
TROPONIN T SERPL HS-MCNC: 68 NG/L
WBC # BLD AUTO: 7.2 10E3/UL (ref 4–11)

## 2025-01-01 PROCEDURE — 999N000157 HC STATISTIC RCP TIME EA 10 MIN

## 2025-01-01 PROCEDURE — 83735 ASSAY OF MAGNESIUM: CPT | Performed by: FAMILY MEDICINE

## 2025-01-01 PROCEDURE — 93005 ELECTROCARDIOGRAM TRACING: CPT | Performed by: PHYSICIAN ASSISTANT

## 2025-01-01 PROCEDURE — 85610 PROTHROMBIN TIME: CPT | Performed by: PHYSICIAN ASSISTANT

## 2025-01-01 PROCEDURE — 84155 ASSAY OF PROTEIN SERUM: CPT | Performed by: PHYSICIAN ASSISTANT

## 2025-01-01 PROCEDURE — 82040 ASSAY OF SERUM ALBUMIN: CPT | Performed by: PHYSICIAN ASSISTANT

## 2025-01-01 PROCEDURE — 250N000013 HC RX MED GY IP 250 OP 250 PS 637: Performed by: PHYSICIAN ASSISTANT

## 2025-01-01 PROCEDURE — 83880 ASSAY OF NATRIURETIC PEPTIDE: CPT | Performed by: PHYSICIAN ASSISTANT

## 2025-01-01 PROCEDURE — 83036 HEMOGLOBIN GLYCOSYLATED A1C: CPT | Performed by: FAMILY MEDICINE

## 2025-01-01 PROCEDURE — 85730 THROMBOPLASTIN TIME PARTIAL: CPT | Performed by: PHYSICIAN ASSISTANT

## 2025-01-01 PROCEDURE — 36415 COLL VENOUS BLD VENIPUNCTURE: CPT | Performed by: PHYSICIAN ASSISTANT

## 2025-01-01 PROCEDURE — 250N000011 HC RX IP 250 OP 636: Performed by: FAMILY MEDICINE

## 2025-01-01 PROCEDURE — 120N000001 HC R&B MED SURG/OB

## 2025-01-01 PROCEDURE — 85018 HEMOGLOBIN: CPT | Performed by: PHYSICIAN ASSISTANT

## 2025-01-01 PROCEDURE — 84132 ASSAY OF SERUM POTASSIUM: CPT | Performed by: PHYSICIAN ASSISTANT

## 2025-01-01 PROCEDURE — 71045 X-RAY EXAM CHEST 1 VIEW: CPT

## 2025-01-01 PROCEDURE — 250N000013 HC RX MED GY IP 250 OP 250 PS 637: Performed by: FAMILY MEDICINE

## 2025-01-01 PROCEDURE — 99285 EMERGENCY DEPT VISIT HI MDM: CPT | Mod: 25 | Performed by: PHYSICIAN ASSISTANT

## 2025-01-01 PROCEDURE — 85025 COMPLETE CBC W/AUTO DIFF WBC: CPT | Performed by: PHYSICIAN ASSISTANT

## 2025-01-01 PROCEDURE — 93010 ELECTROCARDIOGRAM REPORT: CPT | Performed by: INTERNAL MEDICINE

## 2025-01-01 PROCEDURE — 99223 1ST HOSP IP/OBS HIGH 75: CPT | Mod: AI | Performed by: FAMILY MEDICINE

## 2025-01-01 PROCEDURE — 99285 EMERGENCY DEPT VISIT HI MDM: CPT | Performed by: PHYSICIAN ASSISTANT

## 2025-01-01 PROCEDURE — 84484 ASSAY OF TROPONIN QUANT: CPT | Performed by: PHYSICIAN ASSISTANT

## 2025-01-01 RX ORDER — TORSEMIDE 10 MG/1
20 TABLET ORAL ONCE
Status: COMPLETED | OUTPATIENT
Start: 2025-01-01 | End: 2025-01-01

## 2025-01-01 RX ORDER — NITROGLYCERIN 0.4 MG/1
0.4 TABLET SUBLINGUAL EVERY 5 MIN PRN
Status: DISCONTINUED | OUTPATIENT
Start: 2025-01-01 | End: 2025-01-04 | Stop reason: HOSPADM

## 2025-01-01 RX ORDER — PROCHLORPERAZINE MALEATE 5 MG/1
5 TABLET ORAL EVERY 6 HOURS PRN
Status: DISCONTINUED | OUTPATIENT
Start: 2025-01-01 | End: 2025-01-04 | Stop reason: HOSPADM

## 2025-01-01 RX ORDER — ONDANSETRON 4 MG/1
4 TABLET, ORALLY DISINTEGRATING ORAL EVERY 6 HOURS PRN
Status: DISCONTINUED | OUTPATIENT
Start: 2025-01-01 | End: 2025-01-04 | Stop reason: HOSPADM

## 2025-01-01 RX ORDER — LORATADINE 10 MG/1
10 TABLET ORAL DAILY
Status: DISCONTINUED | OUTPATIENT
Start: 2025-01-02 | End: 2025-01-04 | Stop reason: HOSPADM

## 2025-01-01 RX ORDER — LIDOCAINE 40 MG/G
CREAM TOPICAL
Status: DISCONTINUED | OUTPATIENT
Start: 2025-01-01 | End: 2025-01-04 | Stop reason: HOSPADM

## 2025-01-01 RX ORDER — ONDANSETRON 2 MG/ML
4 INJECTION INTRAMUSCULAR; INTRAVENOUS EVERY 6 HOURS PRN
Status: DISCONTINUED | OUTPATIENT
Start: 2025-01-01 | End: 2025-01-04 | Stop reason: HOSPADM

## 2025-01-01 RX ORDER — CALCIUM CARBONATE 500 MG/1
1000 TABLET, CHEWABLE ORAL 4 TIMES DAILY PRN
Status: DISCONTINUED | OUTPATIENT
Start: 2025-01-01 | End: 2025-01-04 | Stop reason: HOSPADM

## 2025-01-01 RX ORDER — DEXTROSE MONOHYDRATE 25 G/50ML
25-50 INJECTION, SOLUTION INTRAVENOUS
Status: DISCONTINUED | OUTPATIENT
Start: 2025-01-01 | End: 2025-01-04 | Stop reason: HOSPADM

## 2025-01-01 RX ORDER — ASPIRIN 81 MG/1
81 TABLET, CHEWABLE ORAL DAILY
Status: DISCONTINUED | OUTPATIENT
Start: 2025-01-02 | End: 2025-01-04 | Stop reason: HOSPADM

## 2025-01-01 RX ORDER — ENOXAPARIN SODIUM 100 MG/ML
30 INJECTION SUBCUTANEOUS EVERY 24 HOURS
Status: DISCONTINUED | OUTPATIENT
Start: 2025-01-01 | End: 2025-01-04 | Stop reason: HOSPADM

## 2025-01-01 RX ORDER — CETIRIZINE HYDROCHLORIDE 10 MG/1
10 TABLET ORAL DAILY
Status: DISCONTINUED | OUTPATIENT
Start: 2025-01-01 | End: 2025-01-01 | Stop reason: ALTCHOICE

## 2025-01-01 RX ORDER — MAGNESIUM OXIDE 400 MG/1
400 TABLET ORAL 2 TIMES DAILY
Status: DISCONTINUED | OUTPATIENT
Start: 2025-01-01 | End: 2025-01-04 | Stop reason: HOSPADM

## 2025-01-01 RX ORDER — POTASSIUM CHLORIDE 750 MG/1
10 TABLET, EXTENDED RELEASE ORAL DAILY
Status: DISCONTINUED | OUTPATIENT
Start: 2025-01-02 | End: 2025-01-04 | Stop reason: HOSPADM

## 2025-01-01 RX ORDER — AMOXICILLIN 250 MG
2 CAPSULE ORAL 2 TIMES DAILY PRN
Status: DISCONTINUED | OUTPATIENT
Start: 2025-01-01 | End: 2025-01-04 | Stop reason: HOSPADM

## 2025-01-01 RX ORDER — ACETAMINOPHEN 650 MG/1
650 SUPPOSITORY RECTAL EVERY 4 HOURS PRN
Status: DISCONTINUED | OUTPATIENT
Start: 2025-01-01 | End: 2025-01-04 | Stop reason: HOSPADM

## 2025-01-01 RX ORDER — ATORVASTATIN CALCIUM 40 MG/1
40 TABLET, FILM COATED ORAL AT BEDTIME
Status: DISCONTINUED | OUTPATIENT
Start: 2025-01-01 | End: 2025-01-04 | Stop reason: HOSPADM

## 2025-01-01 RX ORDER — AMOXICILLIN 250 MG
1 CAPSULE ORAL 2 TIMES DAILY PRN
Status: DISCONTINUED | OUTPATIENT
Start: 2025-01-01 | End: 2025-01-04 | Stop reason: HOSPADM

## 2025-01-01 RX ORDER — TORSEMIDE 10 MG/1
20 TABLET ORAL
Status: COMPLETED | OUTPATIENT
Start: 2025-01-01 | End: 2025-01-02

## 2025-01-01 RX ORDER — ACETAMINOPHEN 325 MG/1
650 TABLET ORAL EVERY 4 HOURS PRN
Status: DISCONTINUED | OUTPATIENT
Start: 2025-01-01 | End: 2025-01-04 | Stop reason: HOSPADM

## 2025-01-01 RX ORDER — NICOTINE POLACRILEX 4 MG
15-30 LOZENGE BUCCAL
Status: DISCONTINUED | OUTPATIENT
Start: 2025-01-01 | End: 2025-01-04 | Stop reason: HOSPADM

## 2025-01-01 RX ADMIN — METOPROLOL SUCCINATE 12.5 MG: 25 TABLET, EXTENDED RELEASE ORAL at 17:58

## 2025-01-01 RX ADMIN — ATORVASTATIN CALCIUM 40 MG: 40 TABLET, FILM COATED ORAL at 22:41

## 2025-01-01 RX ADMIN — TORSEMIDE 20 MG: 10 TABLET ORAL at 17:58

## 2025-01-01 RX ADMIN — TORSEMIDE 20 MG: 10 TABLET ORAL at 12:26

## 2025-01-01 RX ADMIN — Medication 400 MG: at 22:41

## 2025-01-01 RX ADMIN — ENOXAPARIN SODIUM 30 MG: 30 INJECTION SUBCUTANEOUS at 22:30

## 2025-01-01 ASSESSMENT — COLUMBIA-SUICIDE SEVERITY RATING SCALE - C-SSRS
6. HAVE YOU EVER DONE ANYTHING, STARTED TO DO ANYTHING, OR PREPARED TO DO ANYTHING TO END YOUR LIFE?: NO
2. HAVE YOU ACTUALLY HAD ANY THOUGHTS OF KILLING YOURSELF IN THE PAST MONTH?: NO
1. IN THE PAST MONTH, HAVE YOU WISHED YOU WERE DEAD OR WISHED YOU COULD GO TO SLEEP AND NOT WAKE UP?: NO

## 2025-01-01 ASSESSMENT — ACTIVITIES OF DAILY LIVING (ADL)
ADLS_ACUITY_SCORE: 29
ADLS_ACUITY_SCORE: 41
ADLS_ACUITY_SCORE: 26
ADLS_ACUITY_SCORE: 41
ADLS_ACUITY_SCORE: 41
ADLS_ACUITY_SCORE: 26
ADLS_ACUITY_SCORE: 31
ADLS_ACUITY_SCORE: 41
ADLS_ACUITY_SCORE: 31
ADLS_ACUITY_SCORE: 41
ADLS_ACUITY_SCORE: 31

## 2025-01-01 NOTE — PLAN OF CARE
"Goal Outcome Evaluation:  Patients vss. BP (!) 143/83 (BP Location: Left arm, Patient Position: Sitting, Cuff Size: Adult Regular)   Pulse 93   Temp 97.6  F (36.4  C) (Tympanic)   Resp 17   Ht 1.676 m (5' 6\")   Wt 101.2 kg (223 lb)   SpO2 96%   BMI 35.99 kg/m   Patient is on 1L of O2 acute. Sats in upper 90's. Patient gets short of breath with exertion. Patient is also unsteady due to chronic back pain. Patient states he had a nerve surgery on his back and has had worsened back pain since. Gave patient a heat pack for this. Patient uses a straight cane for mobility. Patients fingers are dusky and cool. Patient has edema to bilateral lower legs and feet, and abdomen. Patient states it is normal for his abdomen to be firm and rounded but that it is worse today. Heart failure education points added to AVS summary.                      "

## 2025-01-01 NOTE — PROGRESS NOTES
Pt states he does wear a CPAP at home but is unable to bring his in.  He would like to use one of ours while he is here. I will set [facility] home CPAP up with humidity for pt. Hanna Garsia, RRT

## 2025-01-01 NOTE — PROGRESS NOTES
" NS ADMISSION NOTE    Patient admitted to room 332 at approximately 1500 via bed from emergency room. Patient was accompanied by tech.     Verbal SBAR report received from [facility] prior to patient arrival.     Patient ambulated to bed with one assist. Patient alert and oriented X 3. Pain is not well controlled.  Medication(s) being used: none.  . Admission vital signs: Blood pressure (!) 143/83, pulse 93, temperature 97.6  F (36.4  C), temperature source Tympanic, resp. rate 17, height 1.676 m (5' 6\"), weight 100.2 kg (221 lb), SpO2 96%. Patient was oriented to plan of care, call light, bed controls, tv, telephone, bathroom, and visiting hours.     Risk Assessment    The following safety risks were identified during admission: fall. Yellow risk band applied: YES.     Skin Initial Assessment    This writer admitted this patient and completed a full skin assessment and Rafa score in the Adult PCS flowsheet.   Photo documentation of skin problem and/or wound competed via [employer] application (located under Media):  Yes    Appropriate interventions initiated as needed.            Education    Patient has a Reading to Observation order: No  Observation education completed and documented: N/A      Cristiana Holguin RN      "

## 2025-01-01 NOTE — ED TRIAGE NOTES
Pt presents to ED with c/o leg swelling, SOB, and weight gain. Pt reports his wt was 221lb this am and is normally about 204lb. Oxygen sats 88-95% in triage.      Triage Assessment (Adult)       Row Name 01/01/25 1054          Respiratory WDL    Respiratory WDL X;all     Rhythm/Pattern, Respiratory shortness of breath        Neeta Coma Scale    Best Eye Response 4-->(E4) spontaneous     Best Motor Response 6-->(M6) obeys commands     Best Verbal Response 5-->(V5) oriented     Windfall Coma Scale Score 15

## 2025-01-01 NOTE — ED PROVIDER NOTES
"  History     Chief Complaint   Patient presents with    Leg Swelling    Shortness of Breath     HPI  Duane H Telecky is a 84 year old male who arrives to the emergency department with his son for leg swelling and shortness of breath.  Patient has a past medical history of chronic kidney disease stage IV, high cholesterol, hypertension, obesity, BIBI, atrial flutter, proximal atrial fibrillation he does have a Watchman device established, history of acute DVT, status post coronary bypass, angina, ascending aortic aneurysm, type 2 diabetes, former smoker, and iron deficiency anemia.  Over 4 days patient has gone from 204 to 221 in weight, he said shortness of breath and swelling in his legs.  He does not believe that his diuretics are working.  He denies chills sweats body aches headaches change in vision runny nose sore throat he denies chest pain however he endorses palpitations, shortness of breath no cough specifically shortness of breath upon exertion he feels like his belly is full, he has no nausea has had no vomiting no changes in bowel or bladder habits.  He endorses bilateral leg swelling.  He is no longer on blood thinner since he has a Watchman device.    Allergies:  Allergies   Allergen Reactions    Clonidine Shortness Of Breath, Dermatitis, Hives, Itching and Rash    Lisinopril      angioedema    Furosemide Rash     Rash and itchy    Oxycodone Visual Disturbance     \"I could see people trying to kill me\"    Rivaroxaban Rash    Amlodipine Unknown    Chlorthalidone      Dose over 12.5 mg daily causes creatinine to increase    Coreg [Carvedilol] Rash    Diltiazem Rash    Hydralazine Rash    Warfarin Rash     Rash resolved after stopping warfarin       Problem List:    Patient Active Problem List    Diagnosis Date Noted    Acute on chronic congestive heart failure, unspecified heart failure type (H) 01/01/2025     Priority: Medium    Low folate 11/22/2022     Priority: Medium    Presence of Watchman left " atrial appendage closure device 12/03/2021     Priority: Medium    Iron deficiency anemia 05/22/2021     Priority: Medium    Dysuria      Priority: Medium    Gross hematuria      Priority: Medium    Atrial flutter (H)      Priority: Medium    Acute deep vein thrombosis (DVT) of left upper extremity, unspecified vein (H)      Priority: Medium    S/P CABG (coronary artery bypass graft)      Priority: Medium    ASCVD (arteriosclerotic cardiovascular disease)      Priority: Medium    Subclavian artery stenosis, left (H)      Priority: Medium    Stable angina (H)      Priority: Medium    Ascending aortic aneurysm (H)      Priority: Medium    Microalbuminuria      Priority: Medium    Chronic midline low back pain without sciatica      Priority: Medium    Paroxysmal atrial fibrillation (H) 01/26/2021     Priority: Medium    Former smoker 08/28/2020     Priority: Medium    Healthcare maintenance 03/30/2020     Priority: Medium     Formatting of this note might be different from the original.  Colorectal cancer screening: 10/8/13- Tubular adenoma. Repeat 5 years. Due but will wait until after COVID-19 pandemic  Prostate cancer screening: WNL 7/17/17  Lung cancer screening: Former smoker but does not meet criteria.   AAA screen (65+ with fx or tobacco history): Due will discuss   Hep C screen: Not indicated  Diabetes screen: A1c 6.8 on 3/30/20   Lipid screen: WNL 10/7/19  Dexa scan: Not indicated  Tetanus/Pertussis: 8/23/12  Zostavax/Shingrix: Due but will wait until after COVID-19 pandemic  Prevnar/Pneumovax: Prevnar 12/13/16. Pneumovax 12/14/07  Influenza vaccine: Due  Daily aspirin: None  Annual dental and visual exam: UTD      Fasting hyperglycemia 03/08/2018     Priority: Medium    Hyperlipidemia 03/08/2018     Priority: Medium    Hypertension 03/08/2018     Priority: Medium    Obesity (BMI 30-39.9) 03/08/2018     Priority: Medium    Onychocryptosis 03/08/2018     Priority: Medium    Sleep apnea 03/08/2018     Priority:  Medium     Overview:   Well treated with CPAP      CKD (chronic kidney disease) stage 4, GFR 15-29 ml/min (H) 08/07/2017     Priority: Medium    Type 2 diabetes mellitus without complication (H) 12/29/2015     Priority: Medium    Gout 12/15/2015     Priority: Medium    Diverticulosis of large intestine without hemorrhage 11/30/2015     Priority: Medium    Atypical chest pain 12/02/2013     Priority: Medium    Diverticulosis of sigmoid colon 10/08/2013     Priority: Medium    H/O adenomatous polyp of colon 10/08/2013     Priority: Medium    Hemorrhoid prolapse 10/08/2013     Priority: Medium    Rectal bleeding 10/07/2013     Priority: Medium    Allergic rhinitis 03/29/2011     Priority: Medium    Onychomycosis 12/02/2010     Priority: Medium        Past Medical History:    Past Medical History:   Diagnosis Date    Acute kidney failure (H)     Acute tubulo-interstitial nephritis     Angina pectoris (H)     Benign neoplasm of colon     Chronic kidney disease, stage III (moderate) (H)     Essential (primary) hypertension     Gout     Hyperlipidemia     Impaired fasting glucose     Obesity     Obesity     Other chest pain     Sleep apnea        Past Surgical History:    Past Surgical History:   Procedure Laterality Date    APPENDECTOMY OPEN      No Comments Provided    COLONOSCOPY      2003,2013,F/U 2018    EXTRACAPSULAR CATARACT EXTRATION WITH INTRAOCULAR LENS IMPLANT      No Comments Provided    OTHER SURGICAL HISTORY      10/8/13,[id number],OTHER       Family History:    Family History   Problem Relation Age of Onset    Diabetes Mother         Diabetes    Diabetes Father         Diabetes    Diabetes Brother         Diabetes    Diabetes Brother         Diabetes       Social History:  Marital Status:  Single [1]  Social History     Tobacco Use    Smoking status: Former     Current packs/day: 0.00     Average packs/day: 1 pack/day for 25.0 years (25.0 ttl pk-yrs)     Types: Cigarettes     Start date: 1/1/1965     Quit date:  "1990     Years since quittin.0    Smokeless tobacco: Never   Substance Use Topics    Alcohol use: Yes     Alcohol/week: 5.0 standard drinks of alcohol     Comment: Alcoholic Drinks/day: on occasion    Drug use: Unknown     Types: Other     Comment: Drug use: No        Medications:    allopurinol (ZYLOPRIM) 300 MG tablet  aspirin (ASA) 81 MG chewable tablet  atorvastatin (LIPITOR) 40 MG tablet  blood glucose (ACCU-CHEK MAIDA PLUS) test strip  blood glucose monitoring (SOFTCLIX) lancets  cetirizine (ZYRTEC) 10 MG tablet  ciclopirox (LOPROX) 0.77 % cream  doxazosin (CARDURA) 4 MG tablet  levocetirizine (XYZAL) 5 MG tablet  magnesium oxide (MAG-OX) 400 (241.3 Mg) MG tablet  nitroGLYcerin (NITROSTAT) 0.4 MG sublingual tablet  ondansetron (ZOFRAN) 4 MG tablet  potassium chloride ER (K-TAB/KLOR-CON) 10 MEQ CR tablet  torsemide (DEMADEX) 10 MG tablet  Vitamin D, Cholecalciferol, 25 MCG (1000 UT) CAPS          Review of Systems  ROS is listed in HPI  Physical Exam   BP: 110/56  Pulse: 80  Temp: 98.3  F (36.8  C)  Resp: 20  Height: 167.6 cm (5' 6\")  Weight: 100.2 kg (221 lb)  SpO2: (!) 88 %      Physical Exam  Vitals in triage after walking patient had an SpO2 of 88 while at rest in bed it has been 94 to 96%, his blood pressure is 110, his pulse is 80 and irregular, he is afebrile  General: alert, oriented to person, place, time  Head: atraumatic  Eyes: PERRL, corneas clear and conjunctivae clear  Nose: no rhinorrhea/nasal discharge  Chest/Pulmonary: Decreased lung sounds left lower lobe, no chest wall tenderness or deformities, no tachypnea and no cyanosis  Cardiovascular: S1, S2 normal, irregular rate and rhythm 2+, no murmur and  pedal edema  Abdomen: soft, non-tender, no guarding, no rebound tenderness  Musculoskeletal/Extremities: normal extremities,  2+ edema, erythema, tenderness and 5/5 strength in all 4 extremities  Skin: no diaphoresis and skin color normal  Neuro: speech clear and gait stable  Psychiatric: " affect/mood normal, cooperative, normal judgement/insight and memory intact    ED Course     ED Course as of 01/01/25 1358   Wed Jan 01, 2025   1209 XR Chest Port 1 View  IMPRESSION: Postop CABG. Vascular stent upper mediastinum. Trace bilateral pleural effusions. Mild atelectasis in each lung base. Upper lungs are clear. No pulmonary edema.   1209 Elevated BNP he is taking his torsemide today 10 mg we will give him a dose of 20 mg.  He has slight pleural effusions.  Will repeat troponin in 2 hours.  If this does not increase we will look at hospitalization here for diuresis.   1337 Troponin T, High Sensitivity(!)  Troponin has decreased I will reach out to the hospitalist for admission here for diuresis.   1357 Patient will be admitted here for CHF exacerbation have spoken with the hospitalist who is excepted this patient.     Procedures                  Results for orders placed or performed during the hospital encounter of 01/01/25 (from the past 24 hours)   [place] Draw    Narrative    The following orders were created for panel order [place] Draw.  Procedure                               Abnormality         Status                     ---------                               -----------         ------                     Extra Blue Top Tube[[id number]]                              Final result               Extra Red Top Tube[[id number]]                               Final result               Extra Green Top (Lithium...[[id number]]                      Final result               Extra Purple Top Tube[[id number]]                            Final result               Extra Green Top (Lithium...[[id number]]                      Final result                 Please view results for these tests on the individual orders.   Extra Blue Top Tube   Result Value Ref Range    Hold Specimen JIC    Extra Red Top Tube   Result Value Ref Range    Hold Specimen JIC    Extra Green Top (Lithium Heparin) Tube   Result Value Ref Range     Hold Specimen JIC    Extra Purple Top Tube   Result Value Ref Range    Hold Specimen JIC    Extra Green Top (Lithium Heparin) ON ICE   Result Value Ref Range    Hold Specimen JIC    CBC with platelets differential    Narrative    The following orders were created for panel order CBC with platelets differential.  Procedure                               Abnormality         Status                     ---------                               -----------         ------                     CBC with platelets and d...[[id number]]  Abnormal            Final result                 Please view results for these tests on the individual orders.   INR   Result Value Ref Range    INR 0.94 0.85 - 1.15   Partial thromboplastin time   Result Value Ref Range    aPTT 29 22 - 38 Seconds   Comprehensive metabolic panel   Result Value Ref Range    Sodium 136 135 - 145 mmol/L    Potassium 4.7 3.4 - 5.3 mmol/L    Carbon Dioxide (CO2) 23 22 - 29 mmol/L    Anion Gap 16 (H) 7 - 15 mmol/L    Urea Nitrogen 41.5 (H) 8.0 - 23.0 mg/dL    Creatinine 2.89 (H) 0.67 - 1.17 mg/dL    GFR Estimate 21 (L) >60 mL/min/1.73m2    Calcium 8.7 (L) 8.8 - 10.4 mg/dL    Chloride 97 (L) 98 - 107 mmol/L    Glucose 134 (H) 70 - 99 mg/dL    Alkaline Phosphatase 188 (H) 40 - 150 U/L    AST 12 0 - 45 U/L    ALT <5 0 - 70 U/L    Protein Total 6.3 (L) 6.4 - 8.3 g/dL    Albumin 3.8 3.5 - 5.2 g/dL    Bilirubin Total 0.5 <=1.2 mg/dL   Troponin T, High Sensitivity   Result Value Ref Range    Troponin T, High Sensitivity 68 (H) <=22 ng/L   Nt probnp inpatient (BNP)   Result Value Ref Range    N terminal Pro BNP Inpatient 4,226 (H) 0 - 1,800 pg/mL   CBC with platelets and differential   Result Value Ref Range    WBC Count 7.2 4.0 - 11.0 10e3/uL    RBC Count 3.32 (L) 4.40 - 5.90 10e6/uL    Hemoglobin 10.4 (L) 13.3 - 17.7 g/dL    Hematocrit 32.2 (L) 40.0 - 53.0 %    MCV 97 78 - 100 fL    MCH 31.3 26.5 - 33.0 pg    MCHC 32.3 31.5 - 36.5 g/dL    RDW 15.5 (H) 10.0 - 15.0 %    Platelet  Count 132 (L) 150 - 450 10e3/uL    % Neutrophils 76 %    % Lymphocytes 13 %    % Monocytes 7 %    % Eosinophils 3 %    % Basophils 1 %    % Immature Granulocytes 0 %    NRBCs per 100 WBC 0 <1 /100    Absolute Neutrophils 5.4 1.6 - 8.3 10e3/uL    Absolute Lymphocytes 1.0 0.8 - 5.3 10e3/uL    Absolute Monocytes 0.5 0.0 - 1.3 10e3/uL    Absolute Eosinophils 0.2 0.0 - 0.7 10e3/uL    Absolute Basophils 0.0 0.0 - 0.2 10e3/uL    Absolute Immature Granulocytes 0.0 <=0.4 10e3/uL    Absolute NRBCs 0.0 10e3/uL   XR Chest Port 1 View    Narrative    EXAM: XR CHEST PORT 1 VIEW  LOCATION: [facility]  DATE: 1/1/2025    INDICATION: sob  COMPARISON: 9/15/2012      Impression    IMPRESSION: Postop CABG. Vascular stent upper mediastinum. Trace bilateral pleural effusions. Mild atelectasis in each lung base. Upper lungs are clear. No pulmonary edema.   Troponin T, High Sensitivity   Result Value Ref Range    Troponin T, High Sensitivity 61 (H) <=22 ng/L       Medications   torsemide (DEMADEX) tablet 20 mg (20 mg Oral $Given 1/1/25 1226)       Assessments & Plan (with Medical Decision Making)     I have reviewed the nursing notes.    I have reviewed the findings, diagnosis, plan and need for follow up with the patient.          Medical Decision Making  Patient with a past medical history of heart failure as well as bypass is 17 pounds since recent weight, he has got edema in his legs up into his thighs, his EKG shows atrial fibrillation with a rate of 75 I know no ST elevation no ST depression.  He does have a history of DVT he does have a watchman which is reassuring, the nurse states he is requiring 2 to 3 L of oxygen at this point he had decreased lung sounds in his left lower lobe concern for pleural effusion.  His last GFR was under 30 however he is hypoxic he is not tachypneic nor tachycardic there is still concern for a PE less since he has a Watchman device in I will not be able to obtain a CT PE study due  to his past kidney function.  Will obtain a CBC, CMP, troponin, coags as well as BNP and a chest x-ray.      New Prescriptions    No medications on file       Final diagnoses:   Acute on chronic congestive heart failure, unspecified heart failure type (H)       1/1/2025   [facility] AND [facility]       [place] Vergara PA-C  01/01/25 3938

## 2025-01-01 NOTE — H&P
[facility]    History and Physical - Hospitalist Service       Date of Admission:  1/1/2025    Assessment & Plan      Duane H Telecky is a 84 year old male with a PMH significant for hyperglycemia, CAD S/P CABG x 4 followed by effusion, DVT, atrial fib not tolerating oral anticoagulants so with Watchman, CKD-4, sleep apnea, left subclavian artery stenosis S/P balloon stent and chronic low back pain presented to the ER with complaints of leg swelling and shortness of breath.  Symptoms have been present for a little over a week but the past 4 days have been worse.  He has gained 17 pounds and can no longer walk from his house to his garage without having to rest due to dyspnea.  Denies chest pain or fever.  Only had this type of dyspnea following his CABG when he reports he had bleeding after the procedure and had to have it drained out of his back.  Legs have been swelling and red and his abdomen has been distended with gas.  Has been taking torsemide 10 mg daily but does not feel it has been working.  In the ER he was found to have BUN 41.5, Cr 2.89, GFR 21 with his baseline around Cr 2.4.  BNP elevated at 4,226.  Initial troponin 68, repeat down to 61.  Hgb 10.4 which is his baseline.  CXR with bilateral pleural effusions and mild atelectasis.  He was given additional torsemide and is admitted now for continued cares.    Principal Problem:    Acute on chronic heart failure with preserved ejection fraction (HFpEF) (H)    CXR without pulmonary edema but there are effusions, weight gain, dyspnea, edema, elevated BNP and troponin consistent with this being the etiology.  Increase diuresis to torsemide 20 mg po BID (allergic to furosemide with a rash).  Follow labs, I&O, daily weights, symptoms.  Last echo from 10/10/2024 with EF 60-65% so unless symptoms worsen will not repeat as it is less than 6 months ago.  On a diuretic and beta blocker but not SGLP, not on ACE/ARB as EF > 40% and renal  failure.  Will refer to MTM after discharge for medication optimization.    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  Increase diuresis and follow labs.  If kidneys are congested, function may improve with diuresis.  Avoid nephrotoxic medications including allopurinol.      Primary hypertension    Stable.  Slightly elevated at the time of admission but will continue doxazosin, Toprol and increased dose of torsemide and follow.      Paroxysmal atrial fibrillation (H)    Presence of Watchman left atrial appendage closure device    Heart sounds regular, EKG shows atrial fib but QRS is fairly regular.  Continue other cardiac medications as above.      ASCVD (arteriosclerotic cardiovascular disease)    No chest pain but has CHF exacerbation so treat with increased diuresis.  Continue ASA 81 mg daily, Lipitor 40 mg daily, doxazosin 6 mg daily, magnesium, Toprol and torsemide.       Type 2 diabetes mellitus without complication (H)    Last HgbA1C noted in the chart from 10/30/2024 was only 5.9 but will check again now.  Not on any medications to treat this but his entire family has/had DM and he has DM listed on his problem list.  Check POC glucose and cover with sliding scale insulin as indicated.      Iron deficiency anemia    Usual Hgb is around 10 so at his baseline.  Not on iron supplement now.  Should follow-up with his PCP for further management.  Target Hgb in someone with CAD is > 8-9 so his is adequate.        Elevated troponin level    Decreased at recheck.  Likely due to type II NSTEMI due to CHF exacerbation with fluid overload.  Will not trend further as already improving unless he develops additional symptoms.          Diet: Combination Diet Regular Diet Adult; Moderate Consistent Carb (60 g CHO per Meal) Diet; 2 gm NA Diet    DVT Prophylaxis: Enoxaparin (Lovenox) SQ  Bah Catheter: Not present  Lines: None     Cardiac Monitoring: ACTIVE order.  "Indication: Acute decompensated heart failure (48 hours)  Code Status: No CPR- Do NOT Intubate      Clinically Significant Risk Factors Present on Admission          # Hypochloremia: Lowest Cl = 97 mmol/L in last 2 days, will monitor as appropriate        # Drug Induced Platelet Defect: home medication list includes an antiplatelet medication   # Hypertension: Noted on problem list        # Anemia: based on hgb <11       # Obesity: Estimated body mass index is 35.99 kg/m  as calculated from the following:    Height as of this encounter: 1.676 m (5' 6\").    Weight as of this encounter: 101.2 kg (223 lb).              Disposition Plan     Medically Ready for Discharge: Anticipated in 2-4 Days           Fidelina Grady MD  Hospitalist Service  [facility]  Securely message with [employer] (more info)  Text page via [facility] Paging/Directory     ______________________________________________________________________    Chief Complaint   Leg swelling and shortness of breath.    History is obtained from the patient, electronic health record, and emergency department physician    History of Present Illness   Duane H Telecky is a 84 year old male with a PMH significant for hyperglycemia, CAD S/P CABG x 4 followed by effusion, DVT, atrial fib not tolerating oral anticoagulants so with Watchman, CKD-4, sleep apnea, left subclavian artery stenosis S/P balloon stent and chronic low back pain presented to the ER with complaints of leg swelling and shortness of breath.  Symptoms have been present for a little over a week but the past 4 days have been worse.  He has gained 17 pounds and can no longer walk from his house to his garage without having to rest due to dyspnea.  Denies chest pain or fever.  Only had this type of dyspnea following his CABG when he reports he had bleeding after the procedure and had to have it drained out of his back.  Legs have been swelling and red and his abdomen has been distended with " gas.  Has been taking torsemide 10 mg daily but does not feel it has been working.  In the ER he was found to have BUN 41.5, Cr 2.89, GFR 21 with his baseline around Cr 2.4.  BNP elevated at 4,226.  Initial troponin 68, repeat down to 61.  Hgb 10.4 which is his baseline.  CXR with bilateral pleural effusions and mild atelectasis.  He was given additional torsemide and is admitted now for continued cares.      Past Medical History    Past Medical History:   Diagnosis Date    Acute kidney failure (H)     9/14/2012,Resolved after rehydration, lisinopril held and restarted with return to previous baseline creat 1.2    Acute tubulo-interstitial nephritis     9/14/2012,Resolved    Angina pectoris (H)     05/01,Positive exercise test at 8 minutes with double product of 25,000    Benign neoplasm of colon     No Comments Provided    Chronic kidney disease, stage III (moderate) (H)     No Comments Provided    Essential (primary) hypertension     No Comments Provided    Gout     No Comments Provided    Hyperlipidemia     No Comments Provided    Impaired fasting glucose     No Comments Provided    Obesity     No Comments Provided    Obesity     No Comments Provided    Other chest pain     12/2/2013    Sleep apnea     CPAP       Past Surgical History   Past Surgical History:   Procedure Laterality Date    APPENDECTOMY OPEN      No Comments Provided    COLONOSCOPY      2003,2013,F/U 2018    EXTRACAPSULAR CATARACT EXTRATION WITH INTRAOCULAR LENS IMPLANT      No Comments Provided    OTHER SURGICAL HISTORY      10/8/13,157771,OTHER       Prior to Admission Medications   Prior to Admission Medications   Prescriptions Last Dose Informant Patient Reported? Taking?   Vitamin D, Cholecalciferol, 25 MCG (1000 UT) CAPS   Yes No   Sig: Take 2 capsules by mouth daily   allopurinol (ZYLOPRIM) 300 MG tablet   Yes No   aspirin (ASA) 81 MG chewable tablet   Yes No   Sig: Take 81 mg by mouth daily Chew and swallow, take with food   atorvastatin  (LIPITOR) 40 MG tablet   Yes No   Sig: Take 40 mg by mouth At Bedtime   blood glucose (ACCU-CHEK MAIDA PLUS) test strip   Yes No   Sig: USE TO CHECK BLOOD SUGARS ONCE DAILY OR AS DIRECTED, ICD-10-CM: E11.9, NON INSULIN DEPENDENT.   blood glucose monitoring (SOFTCLIX) lancets   Yes No   Sig: USE TO CHECK BLOOD SUGARS ONCE DAILY AS DIRECTED   cetirizine (ZYRTEC) 10 MG tablet   Yes No   Sig: Take 10 mg by mouth daily   ciclopirox (LOPROX) 0.77 % cream   Yes No   Sig: Apply topically 2 times daily   doxazosin (CARDURA) 4 MG tablet   Yes No   Sig: Take 6 mg by mouth at bedtime   levocetirizine (XYZAL) 5 MG tablet   Yes No   Sig: Take 2.5-5 mg by mouth every evening   magnesium oxide (MAG-OX) 400 (241.3 Mg) MG tablet   Yes No   Sig: Take 400 mg by mouth 2 times daily   nitroGLYcerin (NITROSTAT) 0.4 MG sublingual tablet   Yes No   Sig: Place 0.4 mg under the tongue every 5 minutes as needed for chest pain Do not crush; maximum of 3 doses in 15 minutes   ondansetron (ZOFRAN) 4 MG tablet   Yes No   Sig: Take 4 mg by mouth every 12 hours as needed for nausea   potassium chloride ER (K-TAB/KLOR-CON) 10 MEQ CR tablet   Yes No   Sig: Take 10 mEq by mouth daily   torsemide (DEMADEX) 10 MG tablet   Yes No   Sig: Take 10 mg by mouth daily      Facility-Administered Medications: None        Review of Systems    The 5 point Review of Systems is negative other than noted in the HPI or here.     Social History   I have reviewed this patient's social history and updated it with pertinent information if needed.  Social History     Tobacco Use    Smoking status: Former     Current packs/day: 0.00     Average packs/day: 1 pack/day for 25.0 years (25.0 ttl pk-yrs)     Types: Cigarettes     Start date: 1965     Quit date: 1990     Years since quittin.0    Smokeless tobacco: Never   Substance Use Topics    Alcohol use: Yes     Alcohol/week: 5.0 standard drinks of alcohol     Comment: Alcoholic Drinks/day: on occasion    Drug use:  "Unknown     Types: Other     Comment: Drug use: No         Family History   I have reviewed this patient's family history and updated it with pertinent information if needed.  Family History   Problem Relation Age of Onset    Diabetes Mother         Diabetes    Diabetes Father         Diabetes    Diabetes Brother         Diabetes    Diabetes Brother         Diabetes         Allergies   Allergies   Allergen Reactions    Clonidine Shortness Of Breath, Dermatitis, Hives, Itching and Rash    Lisinopril      angioedema    Furosemide Rash     Rash and itchy    Oxycodone Visual Disturbance     \"I could see people trying to kill me\"    Rivaroxaban Rash    Amlodipine Unknown    Chlorthalidone      Dose over 12.5 mg daily causes creatinine to increase    Coreg [Carvedilol] Rash    Diltiazem Rash    Hydralazine Rash    Warfarin Rash     Rash resolved after stopping warfarin        Physical Exam   Vital Signs: Temp: 97.6  F (36.4  C) Temp src: Tympanic BP: (!) 143/83 Pulse: 93   Resp: 17 SpO2: 95 % O2 Device: Nasal cannula Oxygen Delivery: 1 LPM (decreased to RA)  Weight: 223 lbs 0 oz    Constitutional: awake, alert, cooperative, mild distress, appears stated age, and morbidly obese  Eyes: pupils equal, round and reactive to light, extra-ocular muscles intact, and mild bilateral conjunctival injection with eyes moist  ENT: normocephalic, atraumatic  Respiratory: mild respiratory distress, decreased air exchange throughout with rales in the left base to mid and the right base  Cardiovascular: regular rate and rhythm, normal S1 and S2, and no murmur noted, trace lower leg edema  GI: hyperactive bowel sounds, firm, distended, and non-tender  Skin: brightly erythematous dry areas noted on bilateral shins and lower legs, no excoriations, bruises on arms and hands  Musculoskeletal: there is no redness, warmth, or swelling of the joints  full range of motion noted  Neurologic: Mental Status Exam:  Fund of Knowledge:  " normal  Attention/Concentration:  normal  Language:  normal  Cranial Nerves:  cranial nerves II-XII are grossly intact  Motor Exam:  moves all extremities well and symmetrically  Neuropsychiatric: General: normal, calm, and normal eye contact  Level of consciousness: alert / normal  Affect: normal and pleasant  Orientation: oriented to self, place, time and situation  Memory and insight: normal, memory for past and recent events intact, and thought process normal    Medical Decision Making             Data     I have personally reviewed the following data over the past 24 hrs:    7.2  \   10.4 (L)   / 132 (L)     136 97 (L) 41.5 (H) /  121 (H)   4.7 23 2.89 (H) \     ALT: <5 AST: 12 AP: 188 (H) TBILI: 0.5   ALB: 3.8 TOT PROTEIN: 6.3 (L) LIPASE: N/A     Trop: 61 (H) BNP: 4,226 (H)     INR:  0.94 PTT:  29   D-dimer:  N/A Fibrinogen:  N/A       Imaging results reviewed over the past 24 hrs:   Recent Results (from the past 24 hours)   XR Chest Port 1 View    Narrative    EXAM: XR CHEST PORT 1 VIEW  LOCATION: [facility]  DATE: 1/1/2025    INDICATION: sob  COMPARISON: 9/15/2012      Impression    IMPRESSION: Postop CABG. Vascular stent upper mediastinum. Trace bilateral pleural effusions. Mild atelectasis in each lung base. Upper lungs are clear. No pulmonary edema.

## 2025-01-01 NOTE — PROVIDER NOTIFICATION
01/01/25 1528   Initial Information   Patient Belongings remains with patient   Patient Belongings Remaining with Patient clothing;vision aids;cell phone/electronics;purse/wallet   Did you bring any home meds/supplements to the hospital?  No     A               Admission:  I am responsible for any personal items that are not sent to the safe or pharmacy.  [facility] is not responsible for loss, theft or damage of any property in my possession.    Signature:  _________________________________ Date: _______  Time: _____                                              Staff Signature:  ____________________________ Date: ________  Time: _____      2nd Staff person, if patient is unable/unwilling to sign:    Signature: ________________________________ Date: ________  Time: _____     Discharge:  [facility] has returned all of my personal belongings:    Signature: _________________________________ Date: ________  Time: _____                                          Staff Signature:  ____________________________ Date: ________  Time: _____

## 2025-01-02 ENCOUNTER — APPOINTMENT (OUTPATIENT)
Dept: PHYSICAL THERAPY | Facility: OTHER | Age: 85
End: 2025-01-02
Attending: FAMILY MEDICINE
Payer: MEDICARE

## 2025-01-02 ENCOUNTER — APPOINTMENT (OUTPATIENT)
Dept: OCCUPATIONAL THERAPY | Facility: OTHER | Age: 85
End: 2025-01-02
Attending: FAMILY MEDICINE
Payer: MEDICARE

## 2025-01-02 VITALS
WEIGHT: 226 LBS | OXYGEN SATURATION: 96 % | DIASTOLIC BLOOD PRESSURE: 62 MMHG | RESPIRATION RATE: 18 BRPM | SYSTOLIC BLOOD PRESSURE: 114 MMHG | HEART RATE: 87 BPM | HEIGHT: 66 IN | TEMPERATURE: 97.5 F | BODY MASS INDEX: 36.32 KG/M2

## 2025-01-02 LAB
ANION GAP SERPL CALCULATED.3IONS-SCNC: 10 MMOL/L (ref 7–15)
BUN SERPL-MCNC: 43.9 MG/DL (ref 8–23)
CALCIUM SERPL-MCNC: 8.5 MG/DL (ref 8.8–10.4)
CHLORIDE SERPL-SCNC: 100 MMOL/L (ref 98–107)
CREAT SERPL-MCNC: 2.68 MG/DL (ref 0.67–1.17)
EGFRCR SERPLBLD CKD-EPI 2021: 23 ML/MIN/1.73M2
ERYTHROCYTE [DISTWIDTH] IN BLOOD BY AUTOMATED COUNT: 15.5 % (ref 10–15)
GLUCOSE BLDC GLUCOMTR-MCNC: 103 MG/DL (ref 70–99)
GLUCOSE BLDC GLUCOMTR-MCNC: 112 MG/DL (ref 70–99)
GLUCOSE BLDC GLUCOMTR-MCNC: 98 MG/DL (ref 70–99)
GLUCOSE BLDC GLUCOMTR-MCNC: 98 MG/DL (ref 70–99)
GLUCOSE SERPL-MCNC: 90 MG/DL (ref 70–99)
HCO3 SERPL-SCNC: 26 MMOL/L (ref 22–29)
HCT VFR BLD AUTO: 28 % (ref 40–53)
HGB BLD-MCNC: 9.2 G/DL (ref 13.3–17.7)
HOLD SPECIMEN: NORMAL
HOLD SPECIMEN: NORMAL
MCH RBC QN AUTO: 31.5 PG (ref 26.5–33)
MCHC RBC AUTO-ENTMCNC: 32.9 G/DL (ref 31.5–36.5)
MCV RBC AUTO: 96 FL (ref 78–100)
PLATELET # BLD AUTO: 136 10E3/UL (ref 150–450)
POTASSIUM SERPL-SCNC: 4.6 MMOL/L (ref 3.4–5.3)
RBC # BLD AUTO: 2.92 10E6/UL (ref 4.4–5.9)
SODIUM SERPL-SCNC: 136 MMOL/L (ref 135–145)
WBC # BLD AUTO: 5.3 10E3/UL (ref 4–11)

## 2025-01-02 PROCEDURE — 97535 SELF CARE MNGMENT TRAINING: CPT | Mod: GO | Performed by: OCCUPATIONAL THERAPIST

## 2025-01-02 PROCEDURE — 85018 HEMOGLOBIN: CPT | Performed by: FAMILY MEDICINE

## 2025-01-02 PROCEDURE — 85041 AUTOMATED RBC COUNT: CPT | Performed by: FAMILY MEDICINE

## 2025-01-02 PROCEDURE — 97165 OT EVAL LOW COMPLEX 30 MIN: CPT | Mod: GO | Performed by: OCCUPATIONAL THERAPIST

## 2025-01-02 PROCEDURE — 250N000013 HC RX MED GY IP 250 OP 250 PS 637: Performed by: FAMILY MEDICINE

## 2025-01-02 PROCEDURE — 36415 COLL VENOUS BLD VENIPUNCTURE: CPT | Performed by: FAMILY MEDICINE

## 2025-01-02 PROCEDURE — 250N000011 HC RX IP 250 OP 636: Performed by: FAMILY MEDICINE

## 2025-01-02 PROCEDURE — 80048 BASIC METABOLIC PNL TOTAL CA: CPT | Performed by: FAMILY MEDICINE

## 2025-01-02 PROCEDURE — 97530 THERAPEUTIC ACTIVITIES: CPT | Mod: GO | Performed by: OCCUPATIONAL THERAPIST

## 2025-01-02 PROCEDURE — 120N000001 HC R&B MED SURG/OB

## 2025-01-02 PROCEDURE — 97162 PT EVAL MOD COMPLEX 30 MIN: CPT | Mod: GP

## 2025-01-02 PROCEDURE — 99232 SBSQ HOSP IP/OBS MODERATE 35: CPT | Performed by: FAMILY MEDICINE

## 2025-01-02 PROCEDURE — 97530 THERAPEUTIC ACTIVITIES: CPT | Mod: GP

## 2025-01-02 RX ORDER — TRIAMCINOLONE ACETONIDE 5 MG/G
CREAM TOPICAL 2 TIMES DAILY PRN
COMMUNITY
Start: 2024-12-25

## 2025-01-02 RX ORDER — VERAPAMIL HYDROCHLORIDE 120 MG/1
120 TABLET, FILM COATED, EXTENDED RELEASE ORAL DAILY
COMMUNITY
Start: 2024-01-22

## 2025-01-02 RX ORDER — NALOXONE HYDROCHLORIDE 0.4 MG/ML
0.2 INJECTION, SOLUTION INTRAMUSCULAR; INTRAVENOUS; SUBCUTANEOUS
Status: DISCONTINUED | OUTPATIENT
Start: 2025-01-02 | End: 2025-01-04 | Stop reason: HOSPADM

## 2025-01-02 RX ORDER — TORSEMIDE 10 MG/1
10 TABLET ORAL
Status: DISCONTINUED | OUTPATIENT
Start: 2025-01-03 | End: 2025-01-03

## 2025-01-02 RX ORDER — NALOXONE HYDROCHLORIDE 0.4 MG/ML
0.4 INJECTION, SOLUTION INTRAMUSCULAR; INTRAVENOUS; SUBCUTANEOUS
Status: DISCONTINUED | OUTPATIENT
Start: 2025-01-02 | End: 2025-01-04 | Stop reason: HOSPADM

## 2025-01-02 RX ORDER — TRAMADOL HYDROCHLORIDE 50 MG/1
50 TABLET ORAL EVERY 6 HOURS PRN
Status: DISCONTINUED | OUTPATIENT
Start: 2025-01-02 | End: 2025-01-02

## 2025-01-02 RX ADMIN — POTASSIUM CHLORIDE 10 MEQ: 750 TABLET, FILM COATED, EXTENDED RELEASE ORAL at 09:00

## 2025-01-02 RX ADMIN — ASPIRIN 81 MG 81 MG: 81 TABLET ORAL at 09:00

## 2025-01-02 RX ADMIN — METOPROLOL SUCCINATE 12.5 MG: 25 TABLET, EXTENDED RELEASE ORAL at 09:00

## 2025-01-02 RX ADMIN — LORATADINE 10 MG: 10 TABLET ORAL at 09:00

## 2025-01-02 RX ADMIN — TORSEMIDE 20 MG: 10 TABLET ORAL at 15:14

## 2025-01-02 RX ADMIN — Medication 400 MG: at 22:06

## 2025-01-02 RX ADMIN — TRAMADOL HYDROCHLORIDE 50 MG: 50 TABLET, COATED ORAL at 11:39

## 2025-01-02 RX ADMIN — Medication 400 MG: at 09:00

## 2025-01-02 RX ADMIN — ACETAMINOPHEN 650 MG: 325 TABLET, FILM COATED ORAL at 09:03

## 2025-01-02 RX ADMIN — ATORVASTATIN CALCIUM 40 MG: 40 TABLET, FILM COATED ORAL at 22:06

## 2025-01-02 RX ADMIN — HYDROMORPHONE HYDROCHLORIDE 1 MG: 2 TABLET ORAL at 15:53

## 2025-01-02 RX ADMIN — TORSEMIDE 20 MG: 10 TABLET ORAL at 09:00

## 2025-01-02 RX ADMIN — DOXAZOSIN 6 MG: 4 TABLET ORAL at 22:06

## 2025-01-02 RX ADMIN — ENOXAPARIN SODIUM 30 MG: 30 INJECTION SUBCUTANEOUS at 22:06

## 2025-01-02 ASSESSMENT — ACTIVITIES OF DAILY LIVING (ADL)
ADLS_ACUITY_SCORE: 33
ADLS_ACUITY_SCORE: 33
ADLS_ACUITY_SCORE: 49
ADLS_ACUITY_SCORE: 33
ADLS_ACUITY_SCORE: 49
ADLS_ACUITY_SCORE: 33
ADLS_ACUITY_SCORE: 49
ADLS_ACUITY_SCORE: 33
ADLS_ACUITY_SCORE: 49
ADLS_ACUITY_SCORE: 31
ADLS_ACUITY_SCORE: 47
ADLS_ACUITY_SCORE: 49
ADLS_ACUITY_SCORE: 47
ADLS_ACUITY_SCORE: 49
ADLS_ACUITY_SCORE: 33
ADLS_ACUITY_SCORE: 47
ADLS_ACUITY_SCORE: 33
ADLS_ACUITY_SCORE: 49
ADLS_ACUITY_SCORE: 49
ADLS_ACUITY_SCORE: 33
ADLS_ACUITY_SCORE: 31

## 2025-01-02 NOTE — PROGRESS NOTES
Interdisciplinary Discharge Planning Note    Anticipated Discharge Date: 1/3-1/4    Anticipated Discharge Location: Home    Clinical Needs Before Discharge:  stable cardiac status (angina, heart failure, arrhythmia), stable oxygen requirement, and Diuresis     Treatment Needs After Discharge:  homecare    Potential Barriers to Discharge: None identified     KEYON Child  1/2/2025,  12:58 PM

## 2025-01-02 NOTE — PROGRESS NOTES
[facility]    Medicine Progress Note - Hospitalist Service    Date of Admission:  1/1/2025    Assessment & Plan      Duane H Telecky is a 84 year old male with a PMH significant for hyperglycemia, CAD S/P CABG x 4 followed by effusion, DVT, atrial fib not tolerating oral anticoagulants so with Watchman, CKD-4, sleep apnea, left subclavian artery stenosis S/P balloon stent and chronic low back pain presented to the ER with complaints of leg swelling and shortness of breath.  Symptoms have been present for a little over a week but the past 4 days have been worse.  He has gained 17 pounds and can no longer walk from his house to his garage without having to rest due to dyspnea.  Denies chest pain or fever.  Only had this type of dyspnea following his CABG when he reports he had bleeding after the procedure and had to have it drained out of his back.  Legs have been swelling and red and his abdomen has been distended with gas.  Has been taking torsemide 10 mg daily but does not feel it has been working.  In the ER he was found to have BUN 41.5, Cr 2.89, GFR 21 with his baseline around Cr 2.4.  BNP elevated at 4,226.  Initial troponin 68, repeat down to 61.  Hgb 10.4 which is his baseline.  CXR with bilateral pleural effusions and mild atelectasis.  He was given additional torsemide and admitted for continued cares.    Principal Problem:    Acute on chronic heart failure with preserved ejection fraction (HFpEF) (H)    CXR without pulmonary edema but there are effusions, weight gain, dyspnea, edema, elevated BNP and troponin consistent with this being the etiology.  Increase diuresis to torsemide 20 mg po BID (allergic to furosemide with a rash).  Follow labs, I&O, daily weights, symptoms.  Last echo from 10/10/2024 with EF 60-65% so unless symptoms worsen will not repeat as it is less than 6 months ago.  On a diuretic and beta blocker but not SGLP, not on ACE/ARB as EF > 40% and renal failure.  Will  refer to MTM after discharge for medication optimization.  Had > 1 liter net output but gained 5 pounds?  Feeling better, renal function slightly improved.  Continue aggressive torsemide dosing at 40 mg BID today and decrease to 10 mg BID tomorrow if he continues to feel better.     Active Problems:    CKD (chronic kidney disease) stage 4, GFR 15-29 ml/min (H)    Slightly improved with increased diuresis.  Follow labs.  Avoid nephrotoxic medications including allopurinol and metformin which are held.      Primary hypertension    Stable.  Slightly elevated at the time of admission but improved with doxazosin, Toprol and increased dose of torsemide and follow.      Paroxysmal atrial fibrillation (H)    Presence of Watchman left atrial appendage closure device    Heart sounds regular, EKG shows atrial fib but QRS is fairly regular.  Continue other cardiac medications as above.      ASCVD (arteriosclerotic cardiovascular disease)    No chest pain but has CHF exacerbation so treat with increased diuresis.  Continue ASA 81 mg daily, Lipitor 40 mg daily, doxazosin 6 mg daily, magnesium, Toprol and torsemide.       Type 2 diabetes mellitus without complication (H)    Last HgbA1C noted in the chart from 10/30/2024 was only 5.9 but now is 6.7 so still controlled.  Not on any medications to treat this but his entire family has/had DM and he has DM listed on his problem list.  Check POC glucose and cover with sliding scale insulin as indicated.      Iron deficiency anemia    Usual Hgb is around 10 but decreased to 9.2 after diuresis.  Not on iron supplement now.  Should follow-up with his PCP for further management.  Target Hgb in someone with CAD is > 8-9 so his is still adequate.  Recheck in AM to be sure not decreasing further.        Elevated troponin level    Decreased at recheck.  Likely due to type II NSTEMI due to CHF exacerbation with fluid overload.  Will not trend further as already improving unless he develops  "additional symptoms.          Diet: Combination Diet Regular Diet Adult; Moderate Consistent Carb (60 g CHO per Meal) Diet; 2 gm NA Diet    DVT Prophylaxis: Enoxaparin (Lovenox) SQ  Bah Catheter: Not present  Lines: None     Cardiac Monitoring: ACTIVE order. Indication: Acute decompensated heart failure (48 hours)  Code Status: No CPR- Do NOT Intubate      Clinically Significant Risk Factors Present on Admission          # Hypochloremia: Lowest Cl = 97 mmol/L in last 2 days, will monitor as appropriate  # Hypocalcemia: Lowest Ca = 8.5 mg/dL in last 2 days, will monitor and replace as appropriate       # Drug Induced Platelet Defect: home medication list includes an antiplatelet medication   # Hypertension: Noted on problem list      # Anemia: based on hgb <11      # DMII: A1C = 6.7 % (Ref range: <5.7 %) within past 6 months    # Obesity: Estimated body mass index is 36.48 kg/m  as calculated from the following:    Height as of this encounter: 1.676 m (5' 6\").    Weight as of this encounter: 102.5 kg (226 lb).              Social Drivers of Health    Tobacco Use: Medium Risk (1/1/2025)    Patient History     Smoking Tobacco Use: Former     Smokeless Tobacco Use: Never   Physical Activity: Inactive (10/30/2024)    [facility] [employer] [facility]    Exercise Vital Sign     Days of Exercise per Week: 0 days     Minutes of Exercise per Session: 0 min   Social Connections: Socially Isolated (10/30/2024)    Received from [facility] [employer] [facility]    Social Connection and Isolation Panel [NHANES]     Frequency of Communication with Friends and Family: More than three times a week     Frequency of Social Gatherings with Friends and Family: Once a week     Attends [employer] Services: Never     Active Member of Clubs or Organizations: No     Attends Club or Organization Meetings: Never     Marital Status:           Disposition Plan     Medically Ready for Discharge: " Anticipated in 2-4 Days             Fidelina Grady MD  Hospitalist Service  [facility]  Securely message with [employer] (more info)  Text page via [facility] Paging/Directory   ______________________________________________________________________    Interval History   Feeling better.  Not as short of breath as he was but still weak with limited activity.  Has been voiding a lot.  No new pain but has chronic back pain and is not having consistent benefit from Tylenol.    Physical Exam   Vital Signs: Temp: 97.7  F (36.5  C) Temp src: Tympanic BP: 115/70 Pulse: 95   Resp: 16 SpO2: 93 % O2 Device: None (Room air) Oxygen Delivery: 1 LPM (decreased to RA)  Weight: 226 lbs 0 oz    Constitutional: awake, alert, cooperative, no apparent distress at rest, appears stated age, and morbidly obese  Eyes: pupils equal, round and reactive to light, extra-ocular muscles intact, and bilateral eyes moist  ENT: normocephalic, atraumatic  Respiratory: no respiratory distress, decreased but improved air exchange throughout with rales in bilateral bases but improved since admission  Cardiovascular: regular rate and rhythm, normal S1 and S2, and no murmur noted, trace lower leg edema decreased since admission  GI: normal bowel sounds, soft, distended at his baseline and non-tender  Skin: decreased intensity of the erythematous dry areas noted on bilateral shins and lower legs, no excoriations, bruises on arms and hands  Musculoskeletal: there is no redness, warmth, or swelling of the joints  full range of motion noted  Neurologic: Mental Status Exam:  Fund of Knowledge:  normal  Attention/Concentration:  normal  Language:  normal  Cranial Nerves:  cranial nerves II-XII are grossly intact  Motor Exam:  moves all extremities well and symmetrically  Neuropsychiatric: General: normal, calm, and normal eye contact  Level of consciousness: alert / normal  Affect: normal and pleasant  Orientation: oriented to self, place, time and  situation  Memory and insight: normal, memory for past and recent events intact, and thought process normal    Medical Decision Making             Data     I have personally reviewed the following data over the past 24 hrs:    5.3  \   9.2 (L)   / 136 (L)     136 100 43.9 (H) /  112 (H)   4.6 26 2.68 (H) \     TSH: N/A T4: N/A A1C: N/A

## 2025-01-02 NOTE — PLAN OF CARE
Goal Outcome Evaluation:      Plan of Care Reviewed With: patient    Overall Patient Progress: improvingOverall Patient Progress: improving

## 2025-01-02 NOTE — PROGRESS NOTES
01/02/25 1031   Appointment Info   Signing Clinician's Name / Credentials (PT) Maureen Atkinson, PT, DPT   Living Environment   People in Home alone   Current Living Arrangements house   Home Accessibility stairs to enter home   Number of Stairs, Main Entrance 4   Stair Railings, Main Entrance railings safe and in good condition   Transportation Anticipated family or friend will provide   Living Environment Comments Patient has a step in tub with shower chair. Patient has  come in 1x per month. Patient is able to complete IADLs with rest breaks as needed. Patient is independent with ADLs. Patient ambulates with steady cane. Patient drives.   General Information   Onset of Illness/Injury or Date of Surgery 01/01/25   Referring Physician Dr. Grady   Pertinent History of Current Problem (include personal factors and/or comorbidities that impact the POC) Acute on chronic heart failure with preserved EF   Cognition   Affect/Mental Status (Cognition) WNL   Orientation Status (Cognition) oriented x 3   Follows Commands (Cognition) WNL   Pain Assessment   Patient Currently in Pain Yes, see Vital Sign flowsheet   Integumentary/Edema   Integumentary/Edema no deficits were identifed   Posture    Posture Forward head position;Protracted shoulders   Range of Motion (ROM)   Range of Motion ROM is WFL   Strength (Manual Muscle Testing)   Strength (Manual Muscle Testing) No deficits observed during functional mobility   Transfers   Transfers sit-stand transfer   Maintains Weight-bearing Status (Transfers) able to maintain   Sit-Stand Transfer   Sit-Stand [place] (Transfers) contact guard   Assistive Device (Sit-Stand Transfers) cane, straight   Gait/Stairs (Locomotion)   [place] Level (Gait) contact guard;1 person assist   Assistive Device (Gait) cane, straight   Distance in Feet (Gait) 30   Pattern (Gait) 3-point   Deviations/Abnormal Patterns (Gait) antalgic;gait speed decreased   Balance   Balance other  (describe)   Balance Comments Requires use of steady cane   Clinical Impression   Criteria for Skilled Therapeutic Intervention Yes, treatment indicated   PT Diagnosis (PT) generalized weakness   Influenced by the following impairments decreased strength, decreased endurance, decreased balance   Functional limitations due to impairments decreased gait speed, unsteady, use of AD   Clinical Presentation (PT Evaluation Complexity) evolving   Clinical Decision Making (Complexity) moderate complexity   Planned Therapy Interventions (PT) balance training;gait training;stair training   Risk & Benefits of therapy have been explained evaluation/treatment results reviewed;care plan/treatment goals reviewed;participants included;patient   PT Total Evaluation Time   PT Eval, Moderate Complexity Minutes (05783) 15   Physical Therapy Goals   PT Frequency Daily   PT Predicted Duration/Target Date for Goal Attainment 01/04/25   PT Goals Bed Mobility;Transfers;Gait;Stairs   PT: Bed Mobility Independent   PT: Transfers Independent;Sit to/from stand;Bed to/from chair   PT: Gait Independent;Assistive device;Straight cane;100 feet   PT: Stairs Independent;Assistive device;4 stairs;Rail on right   Interventions   Interventions Quick Adds Therapeutic Activity   Therapeutic Activity   Therapeutic Activities: dynamic activities to improve functional performance Minutes (48786) 15   Symptoms Noted During/After Treatment Fatigue;Increased pain   Treatment Detail/Skilled Intervention Patient was seated in chair upon PT entering the room. Patient is agreeable to in room therapy. Patient currently reports back pain that is chronic in nature.  socks were on. Gait belt was donned in sitting. Patient was CGA in order to stand using steady cane. Patient ambulated from recliner to bathroom. Patient required assistance with urinal. Patient ambulated from bathroom back to recliner with CGA using steady cane. Patient ambulated about  30 feet total  in room. Once seated patient was CGA to min assist in order to complete some upper body cares. Patient tolerated therapy session fair. Patient reports pain through legs when standing and walking. Patient ambulates with a bradykinetic gait speed. Patient demonstrates a short shuffled step length. Patient is slightly unsteady with ambulation. Patient would benefit from continued skilled physical therapy in order to improve strength, balance and endurance in order to facilitate return to PLOF. Patient was seated in chair with call light in reach and posey alarm on upon PT exiting the room.   PT Discharge Planning   PT Plan Continue PT   PT Discharge Recommendation (DC Rec) home with home care physical therapy;home with assist   PT Rationale for DC Rec Patient would benefit from continued skilled physical therapy in order to improve strength, balance and endurance in order to facilitate return to PLOF.   PT Brief overview of current status Patient was CGA in order to stand using steady cane. Patient ambulated to bathroom and back to recliner about 30 feet total. Slightly unsteady, bradykinetic gait.   Physical Therapy Time and Intention   Timed Code Treatment Minutes 15   Total Session Time (sum of timed and untimed services) 30

## 2025-01-02 NOTE — PHARMACY-ADMISSION MEDICATION HISTORY
Pharmacist Admission Medication History    Admission medication history is complete. The information provided in this note is only as accurate as the sources available at the time of the update.    Information Source(s): Patient, Facility (U/NH/) medication list/MAR, and CareEverywhere/SureScripts via in-person    Pertinent Information: Patient is a poor historian. Patient did bring in a recent medication list. Patient medication list matches SureScripts.    Changes made to PTA medication list:  Added:   Verapamil ER  Medication on SureScripts and patent's medication list   Trimcinolone 0.5% cream  Medication on SureScripts and patent's medication list   Deleted:   None  Changed:   None    Allergies reviewed with patient and updates made in EHR: yes    Medication History Completed By: Setfan Carrillo [facility] 1/2/2025 10:46 AM    PTA Med List   Medication Sig Last Dose/Taking    allopurinol (ZYLOPRIM) 300 MG tablet Take 150 mg by mouth daily. 1/1/2025 Morning    aspirin (ASA) 81 MG chewable tablet Take 81 mg by mouth daily Chew and swallow, take with food 1/1/2025 Morning    cetirizine (ZYRTEC) 10 MG tablet Take 10 mg by mouth daily 1/1/2025 Morning    ciclopirox (LOPROX) 0.77 % cream Apply topically 2 times daily as needed (Rash on feet). Past Week    magnesium oxide (MAG-OX) 400 (241.3 Mg) MG tablet Take 400 mg by mouth 2 times daily 1/1/2025 Morning    potassium chloride ER (K-TAB/KLOR-CON) 10 MEQ CR tablet Take 10 mEq by mouth daily 1/1/2025 Morning    torsemide (DEMADEX) 10 MG tablet Take 10 mg by mouth daily 1/1/2025 Morning    triamcinolone (ARISTOCORT HP) 0.5 % external cream Apply topically 2 times daily as needed for irritation. Past Week    verapamil ER (CALAN-SR) 120 MG CR tablet Take 120 mg by mouth daily. 1/1/2025 Morning    Vitamin D, Cholecalciferol, 25 MCG (1000 UT) CAPS Take 2 capsules by mouth daily 1/1/2025 Morning

## 2025-01-02 NOTE — PROGRESS NOTES
:     Patient comes from home alone. Spoke with patient about discharge planning. Patient is interested in home care and opted for [facility]. Patient stated his son or friend will transport him home when he is discharged.     A referral was sent to [facility].     Pt/family was given the Medicare Compare list for Home Care, with associated star ratings to assist with choice for referrals/discharge planning Yes    Education was given to pt/family that star ratings are updated/maintained by Medicare and can be reviewed by visiting www.medicare.gov Yes    KEYON Child on 1/2/2025 at 12:58 PM

## 2025-01-02 NOTE — PROGRESS NOTES
"Pt has Cardura 6 mg due tonight.  In pt gray med box there are two 1 mg tabs and a baggie that says 4 mg on it but the med is not in the baggie.  Looked in all the other gray med bins and med was not seen in them.  House supervisor was notified.  She attempted to go to pharmacy, but was not able to pull medication.  Pt notified that we don't have this med available tonight.  He states that its ok because he doesn't want his BP to go any lower than what it already is. And per patient that this med will lower his BP.  So he states he is ok if he misses this dose.      /61 (BP Location: Left arm, Patient Position: Semi-Zeng's, Cuff Size: Adult Regular)   Pulse 74   Temp 98.9  F (37.2  C) (Tympanic)   Resp 16   Ht 1.676 m (5' 6\")   Wt 101.2 kg (223 lb)   SpO2 90%   BMI 35.99 kg/m      "

## 2025-01-02 NOTE — PROGRESS NOTES
01/02/25 1256   Appointment Info   Signing Clinician's Name / Credentials (OT) Nikole Bryson, OTR/L   Living Environment   People in Home alone   Current Living Arrangements house   Home Accessibility stairs to enter home   Number of Stairs, Main Entrance 4   Stair Railings, Main Entrance railings safe and in good condition   Transportation Anticipated family or friend will provide   Living Environment Comments Pt has son involved who assists at times, pt has a  1x/month and independent with ADL's, driving, etc   Self-Care   Usual Activity Tolerance fair   Current Activity Tolerance fair   Equipment Currently Used at Home cane, straight   Fall history within last six months no   Cognitive Status Examination   Orientation Status orientation to person, place and time   Affect/Mental Status (Cognitive) WFL   Follows Commands WFL   Visual Perception   Visual Impairment/Limitations WFL   Pain Assessment   Patient Currently in Pain   (yes, reports long history of back pain)   Range of Motion Comprehensive   General Range of Motion bilateral upper extremity ROM WFL   Coordination   Upper Extremity Coordination No deficits were identified   Transfers   Transfers sit-stand transfer   Sit-Stand Transfer   Sit-Stand [place] (Transfers) contact guard;1 person to manage equipment   Assistive Device (Sit-Stand Transfers) walker, front-wheeled   Clinical Impression   Criteria for Skilled Therapeutic Interventions Met (OT) Yes, treatment indicated   OT Diagnosis CHF   Influenced by the following impairments decreased endurance and fatigue with activity. pain in back   OT Problem List-Impairments impacting ADL activity tolerance impaired;pain   Assessment of Occupational Performance 1-3 Performance Deficits   Identified Performance Deficits mobility and self care   Planned Therapy Interventions (OT) ADL retraining;progressive activity/exercise   Clinical Decision Making Complexity (OT) problem focused  assessment/low complexity   Risk & Benefits of therapy have been explained risks/benefits reviewed   OT Total Evaluation Time   OT Eval, Low Complexity Minutes (12251) 15   OT Goals   Therapy Frequency (OT) Daily   OT Predicted Duration/Target Date for Goal Attainment 01/04/25   OT Goals Hygiene/Grooming;Upper Body Dressing;Lower Body Dressing;Upper Body Bathing;Lower Body Bathing;Toilet Transfer/Toileting   OT: Hygiene/Grooming supervision/stand-by assist   OT: Upper Body Dressing Supervision/stand-by assist   OT: Lower Body Dressing Supervision/stand-by assist   OT: Upper Body Bathing Supervision/stand-by assist   OT: Lower Body Bathing Supervision/stand-by assist   OT: Toilet Transfer/Toileting Supervision/stand-by assist   Interventions   Interventions Quick Adds Self-Care/Home Management;Therapeutic Activity   Self-Care/Home Management   Self-Care/Home Mgmt/ADL, Compensatory, Meal Prep Minutes (06634) 15   [place] Level (Grooming Training) stand-by assist   Assistance (Grooming Training) supervision   [place] Level (Bathing Training) stand-by assist   Assistance (Bathing Training) supervision;set-up required   Lower Body Dressing Training Assistance maximum assist (25% patient effort)   Lower Body Dressing Training Assistance 1 person assist   [place] Level (Toilet Training) maximum assist (25% patient effort)   Assistance (Toilet Training) 1 person assist  (assist to use urinal in standing)   Therapeutic Activities   Therapeutic Activity Minutes (39367) 10   Symptoms noted during/after treatment fatigue;increased pain   Treatment Detail/Skilled Intervention Pt ambulated in room with cane with CGA, was not able to tolerate much activity due to fatigue and pain in back which is chronic   OT Discharge Planning   OT Plan progress functional mobility and ADL's as needed   OT Discharge Recommendation (DC Rec) home with assist;home with home care occupational therapy   OT Rationale for DC Rec Pt may  benefit from home care therapy, will cont to assess   OT Brief overview of current status see above for details, pt progressing slowly, continues to have decreased endurance for function but improving with diuresis   Total Session Time   Timed Code Treatment Minutes 25   Total Session Time (sum of timed and untimed services) 40

## 2025-01-03 ENCOUNTER — APPOINTMENT (OUTPATIENT)
Dept: PHYSICAL THERAPY | Facility: OTHER | Age: 85
End: 2025-01-03
Payer: MEDICARE

## 2025-01-03 ENCOUNTER — APPOINTMENT (OUTPATIENT)
Dept: OCCUPATIONAL THERAPY | Facility: OTHER | Age: 85
End: 2025-01-03
Payer: MEDICARE

## 2025-01-03 LAB
ANION GAP SERPL CALCULATED.3IONS-SCNC: 13 MMOL/L (ref 7–15)
BUN SERPL-MCNC: 44.2 MG/DL (ref 8–23)
CALCIUM SERPL-MCNC: 8.8 MG/DL (ref 8.8–10.4)
CHLORIDE SERPL-SCNC: 96 MMOL/L (ref 98–107)
CREAT SERPL-MCNC: 2.58 MG/DL (ref 0.67–1.17)
EGFRCR SERPLBLD CKD-EPI 2021: 24 ML/MIN/1.73M2
ERYTHROCYTE [DISTWIDTH] IN BLOOD BY AUTOMATED COUNT: 15.3 % (ref 10–15)
GLUCOSE BLDC GLUCOMTR-MCNC: 102 MG/DL (ref 70–99)
GLUCOSE BLDC GLUCOMTR-MCNC: 107 MG/DL (ref 70–99)
GLUCOSE BLDC GLUCOMTR-MCNC: 111 MG/DL (ref 70–99)
GLUCOSE BLDC GLUCOMTR-MCNC: 88 MG/DL (ref 70–99)
GLUCOSE SERPL-MCNC: 101 MG/DL (ref 70–99)
HCO3 SERPL-SCNC: 27 MMOL/L (ref 22–29)
HCT VFR BLD AUTO: 30.8 % (ref 40–53)
HGB BLD-MCNC: 10.2 G/DL (ref 13.3–17.7)
MCH RBC QN AUTO: 32 PG (ref 26.5–33)
MCHC RBC AUTO-ENTMCNC: 33.1 G/DL (ref 31.5–36.5)
MCV RBC AUTO: 97 FL (ref 78–100)
PLATELET # BLD AUTO: 128 10E3/UL (ref 150–450)
POTASSIUM SERPL-SCNC: 4.1 MMOL/L (ref 3.4–5.3)
RBC # BLD AUTO: 3.19 10E6/UL (ref 4.4–5.9)
SODIUM SERPL-SCNC: 136 MMOL/L (ref 135–145)
WBC # BLD AUTO: 5.7 10E3/UL (ref 4–11)

## 2025-01-03 PROCEDURE — 99232 SBSQ HOSP IP/OBS MODERATE 35: CPT | Performed by: INTERNAL MEDICINE

## 2025-01-03 PROCEDURE — 80048 BASIC METABOLIC PNL TOTAL CA: CPT | Performed by: FAMILY MEDICINE

## 2025-01-03 PROCEDURE — 97116 GAIT TRAINING THERAPY: CPT | Mod: GP

## 2025-01-03 PROCEDURE — 97530 THERAPEUTIC ACTIVITIES: CPT | Mod: GO | Performed by: OCCUPATIONAL THERAPIST

## 2025-01-03 PROCEDURE — 250N000013 HC RX MED GY IP 250 OP 250 PS 637: Performed by: FAMILY MEDICINE

## 2025-01-03 PROCEDURE — 250N000011 HC RX IP 250 OP 636: Performed by: FAMILY MEDICINE

## 2025-01-03 PROCEDURE — 82310 ASSAY OF CALCIUM: CPT | Performed by: FAMILY MEDICINE

## 2025-01-03 PROCEDURE — 120N000001 HC R&B MED SURG/OB

## 2025-01-03 PROCEDURE — 250N000013 HC RX MED GY IP 250 OP 250 PS 637: Performed by: INTERNAL MEDICINE

## 2025-01-03 PROCEDURE — 85027 COMPLETE CBC AUTOMATED: CPT | Performed by: FAMILY MEDICINE

## 2025-01-03 PROCEDURE — 36415 COLL VENOUS BLD VENIPUNCTURE: CPT | Performed by: FAMILY MEDICINE

## 2025-01-03 RX ORDER — TORSEMIDE 10 MG/1
10 TABLET ORAL ONCE
Status: COMPLETED | OUTPATIENT
Start: 2025-01-03 | End: 2025-01-03

## 2025-01-03 RX ORDER — TORSEMIDE 10 MG/1
20 TABLET ORAL
Status: DISCONTINUED | OUTPATIENT
Start: 2025-01-03 | End: 2025-01-04 | Stop reason: HOSPADM

## 2025-01-03 RX ADMIN — TORSEMIDE 10 MG: 10 TABLET ORAL at 08:05

## 2025-01-03 RX ADMIN — POTASSIUM CHLORIDE 10 MEQ: 750 TABLET, FILM COATED, EXTENDED RELEASE ORAL at 09:30

## 2025-01-03 RX ADMIN — Medication 400 MG: at 09:30

## 2025-01-03 RX ADMIN — METOPROLOL SUCCINATE 12.5 MG: 25 TABLET, EXTENDED RELEASE ORAL at 09:31

## 2025-01-03 RX ADMIN — ACETAMINOPHEN 650 MG: 325 TABLET, FILM COATED ORAL at 08:08

## 2025-01-03 RX ADMIN — ENOXAPARIN SODIUM 30 MG: 30 INJECTION SUBCUTANEOUS at 22:15

## 2025-01-03 RX ADMIN — TORSEMIDE 10 MG: 10 TABLET ORAL at 10:08

## 2025-01-03 RX ADMIN — Medication 400 MG: at 22:14

## 2025-01-03 RX ADMIN — TORSEMIDE 20 MG: 10 TABLET ORAL at 14:07

## 2025-01-03 RX ADMIN — ATORVASTATIN CALCIUM 40 MG: 40 TABLET, FILM COATED ORAL at 22:15

## 2025-01-03 RX ADMIN — ASPIRIN 81 MG 81 MG: 81 TABLET ORAL at 09:30

## 2025-01-03 RX ADMIN — LORATADINE 10 MG: 10 TABLET ORAL at 09:30

## 2025-01-03 RX ADMIN — DOXAZOSIN 6 MG: 4 TABLET ORAL at 22:15

## 2025-01-03 ASSESSMENT — ACTIVITIES OF DAILY LIVING (ADL)
ADLS_ACUITY_SCORE: 44
ADLS_ACUITY_SCORE: 44
ADLS_ACUITY_SCORE: 45
ADLS_ACUITY_SCORE: 45
ADLS_ACUITY_SCORE: 44
ADLS_ACUITY_SCORE: 47
ADLS_ACUITY_SCORE: 47
ADLS_ACUITY_SCORE: 44
ADLS_ACUITY_SCORE: 44
ADLS_ACUITY_SCORE: 45
ADLS_ACUITY_SCORE: 47
ADLS_ACUITY_SCORE: 45
ADLS_ACUITY_SCORE: 47
ADLS_ACUITY_SCORE: 45
ADLS_ACUITY_SCORE: 45
ADLS_ACUITY_SCORE: 47
ADLS_ACUITY_SCORE: 44
ADLS_ACUITY_SCORE: 44
ADLS_ACUITY_SCORE: 45

## 2025-01-03 NOTE — PLAN OF CARE
"A&Ox4. VSS, afebrile. Chronic back pain 5/10, pt refused medication intervention d/t medication received during daytime made him \"feel dizzy\" per pt. Assisted pt with repositioning and promoted rest. Pt declined ice/heat therapy. LS clear. LOPEZ/SOB. On room air, uses CPAP at night. BLE blanchable redness to shins and chronic neuropathy. BLE edema 1+-2+. Rounded/distended stomach - pt denies discomfort. Left forearm skin tear covered with Allevyn, small amount of drainage to dressing otherwise CDI. Ambulating with 1 person with cane and gait belt. Stable blood glucose readings on shift, no insulin coverage needed at bedtime.    Temp: 97  F (36.1  C) Temp src: Tympanic BP: 134/72 Pulse: 93   Resp: 20 SpO2: 94 % O2 Device: None (Room air)      Soledad Metz RN .......  1/3/2025  6:07 AM      Goal Outcome Evaluation:      Plan of Care Reviewed With: patient    Overall Patient Progress: improving    Outcome Evaluation: VSS, afebrile, chronic back pain, on room air, ambulating with 1 person assist with cane and gait belt      "

## 2025-01-03 NOTE — PROGRESS NOTES
Interdisciplinary Discharge Planning Note    Anticipated Discharge Date: 1/4    Anticipated Discharge Location: Home    Clinical Needs Before Discharge:  stable oxygen requirement    Treatment Needs After Discharge:  homecare    Potential Barriers to Discharge: None identified     KEYON Child  1/3/2025,  12:21 PM

## 2025-01-03 NOTE — PLAN OF CARE
"Goal Outcome Evaluation:    Pt A/O. VSS. Afebrile. Pt c/o chronic back pain, PRN tylenol given with some relief. Up SBA with belt and cane.     /72 (BP Location: Left arm, Patient Position: Chair, Cuff Size: Adult Regular)   Pulse 75   Temp 97.1  F (36.2  C) (Tympanic)   Resp 18   Ht 1.676 m (5' 6\")   Wt 98.9 kg (218 lb)   SpO2 97%   BMI 35.19 kg/m         Plan of Care Reviewed With: patient    Overall Patient Progress: improvingOverall Patient Progress: improving    Outcome Evaluation: VSS. Afebrile. Chronic back pain. SBA with cane.      "

## 2025-01-03 NOTE — PROGRESS NOTES
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  Symptoms have been present for a little over a week but the past 4 days have been worse.  He has gained 17 pounds and can no longer walk from his house to his garage without having to rest due to dyspnea.  Denies chest pain or fever.  Only had this type of dyspnea following his CABG when he reports he had bleeding after the procedure and had to have it drained out of his back.  Legs have been swelling and red and his abdomen has been distended with gas.  Has been taking torsemide 10 mg daily but does not feel it has been working.  In the ER he was found to have BUN 41.5, Cr 2.89, GFR 21 with his baseline around Cr 2.4.  BNP elevated at 4,226.  Initial troponin 68, repeat down to 61.  Hgb 10.4 which is his baseline.  CXR with bilateral pleural effusions and mild atelectasis.  He was given additional torsemide and admitted for continued cares.    Principal Problem:    Acute on chronic heart failure with preserved ejection fraction (HFpEF) (H)    CXR without pulmonary edema but there are effusions, weight gain, dyspnea, edema, elevated BNP and troponin consistent with this being the etiology.  Increase diuresis to torsemide 20 mg po BID (allergic to furosemide with a rash).  Follow labs, I&O, daily weights, symptoms.  Last echo from 10/10/2024 with EF 60-65% so unless symptoms worsen will not repeat as it is less than 6 months ago.  On a diuretic and beta blocker but not SGLP, not on ACE/ARB as EF > 40% and renal failure.  Will  refer to MTM after discharge for medication optimization.  Had > 1 liter net output but gained 5 pounds?  Feeling better, renal function slightly improved.  Continue aggressive torsemide dosing at 20 mg BID. Anticipated discharge tomorrow.    Active Problems:    CKD (chronic kidney disease) stage 4, GFR 15-29 ml/min (H)    Slightly improved with increased diuresis.  Follow labs.  Avoid nephrotoxic medications including allopurinol and metformin which are held.      Primary hypertension    Stable.  Slightly elevated at the time of admission but improved with doxazosin, Toprol and increased dose of torsemide and follow.      Paroxysmal atrial fibrillation (H)    Presence of Watchman left atrial appendage closure device    Heart sounds regular, EKG shows atrial fib but QRS is fairly regular.  Continue other cardiac medications as above.      ASCVD (arteriosclerotic cardiovascular disease)    No chest pain but has CHF exacerbation so treat with increased diuresis.  Continue ASA 81 mg daily, Lipitor 40 mg daily, doxazosin 6 mg daily, magnesium, Toprol and torsemide.       Type 2 diabetes mellitus without complication (H)    Last HgbA1C noted in the chart from 10/30/2024 was only 5.9 but now is 6.7 so still controlled.  Not on any medications to treat this but his entire family has/had DM and he has DM listed on his problem list.  Check POC glucose and cover with sliding scale insulin as indicated.      Iron deficiency anemia    Usual Hgb is around 10 but decreased to 9.2 after diuresis.  Not on iron supplement now.  Should follow-up with his PCP for further management.  Target Hgb in someone with CAD is > 8-9 so his is still adequate.  Recheck in AM to be sure not decreasing further.        Elevated troponin level    Decreased at recheck.  Likely due to type II NSTEMI due to CHF exacerbation with fluid overload.  Will not trend further as already improving unless he develops additional symptoms.          Diet:  "Combination Diet Regular Diet Adult; Moderate Consistent Carb (60 g CHO per Meal) Diet; 2 gm NA Diet    DVT Prophylaxis: Enoxaparin (Lovenox) SQ  Bah Catheter: Not present  Lines: None     Cardiac Monitoring: ACTIVE order. Indication: Acute decompensated heart failure (48 hours)  Code Status: No CPR- Do NOT Intubate      Clinically Significant Risk Factors          # Hypochloremia: Lowest Cl = 96 mmol/L in last 2 days, will monitor as appropriate        # Thrombocytopenia: Lowest platelets = 128 in last 2 days, will monitor for bleeding   # Hypertension: Noted on problem list           # DMII: A1C = 6.7 % (Ref range: <5.7 %) within past 6 months, PRESENT ON ADMISSION  # Obesity: Estimated body mass index is 35.19 kg/m  as calculated from the following:    Height as of this encounter: 1.676 m (5' 6\").    Weight as of this encounter: 98.9 kg (218 lb)., PRESENT ON ADMISSION            Social Drivers of Health    Tobacco Use: Medium Risk (1/1/2025)    Patient History     Smoking Tobacco Use: Former     Smokeless Tobacco Use: Never   Physical Activity: Inactive (10/30/2024)    [facility]    Exercise Vital Sign     Days of Exercise per Week: 0 days     Minutes of Exercise per Session: 0 min   Social Connections: Socially Isolated (10/30/2024)    [facility]    Social Connection and Isolation Panel [NHANES]     Frequency of Communication with Friends and Family: More than three times a week     Frequency of Social Gatherings with Friends and Family: Once a week     Attends Voodoo Services: Never     Active Member of Clubs or Organizations: No     Attends Club or Organization Meetings: Never     Marital Status:           Disposition Plan     Medically Ready for Discharge: Anticipated Tomorrow             Anson Sweeney MD  Hospitalist Service  [facility]  Securely message with Tiff (more " info)  Text page via [email or web address] Paging/Directory   ______________________________________________________________________    Interval History   Breathing better    Physical Exam   Vital Signs: Temp: 97.5  F (36.4  C) Temp src: Tympanic BP: 98/59 Pulse: 83   Resp: 18 SpO2: 97 % O2 Device: None (Room air)    Weight: 218 lbs 0 oz    GENERAL: Comfortable, no apparent distress.  CARDIOVASCULAR: regular rate and rhythm, no murmur. No lower extremity edema   RESPIRATORY: Clear to auscultation bilaterally, no wheezes or crackles.  GI: non-tender, non-distended, normal bowel sounds.   SKIN: warm periphery, no rashes      Medical Decision Making       45 MINUTES SPENT BY ME on the date of service doing chart review, history, exam, documentation & further activities per the note.      Data     I have personally reviewed the following data over the past 24 hrs:    5.7  \   10.2 (L)   / 128 (L)     136 96 (L) 44.2 (H) /  102 (H)   4.1 27 2.58 (H) \

## 2025-01-03 NOTE — PROGRESS NOTES
:     Patient is anticipated to discharge home over the weekend. [facility] will start services next week.     Patient's son will transport patient home.     KEYON Child on 1/3/2025 at 12:21 PM

## 2025-01-03 NOTE — PLAN OF CARE
SAFETY CHECKLIST  ID Bands and Risk clasps correct and in place (DNR, Fall risk, Allergy, Latex, Limb):  Yes  All Lines Reconciled and labeled correctly: Yes  Whiteboard updated:Yes  Environmental interventions: Yes  Verify Tele #: 3    Soledad Metz RN .......  1/2/2025  9:11 PM

## 2025-01-03 NOTE — PLAN OF CARE
"Goal Outcome Evaluation:      Plan of Care Reviewed With: patient    Overall Patient Progress: improving         Afebrile,vss.Fine crackles to RLL,dyspnea upon exertion present, remains on room air. Lower back pain 3-5/10, prn tramadol and tylenol given with no relief, MD notified and PO dilaudid given with some improvement. +2 to +3 edema, BLE. SBA with cane for ambulating.        /57   Pulse 81   Temp 97.1  F (36.2  C) (Tympanic)   Resp 16   Ht 1.676 m (5' 6\")   Wt 102.5 kg (226 lb)   SpO2 95%   BMI 36.48 kg/m      Problem: Adult Inpatient Plan of Care  Goal: Absence of Hospital-Acquired Illness or Injury  Intervention: Prevent and Manage VTE (Venous Thromboembolism) Risk  Recent Flowsheet Documentation  Taken 1/2/2025 1601 by Leonie Dodson, RN  VTE Prevention/Management: (up in chair) SCDs off (sequential compression devices)    Problem: Adult Inpatient Plan of Care  Goal: Optimal Comfort and Wellbeing  Intervention: Monitor Pain and Promote Comfort  Recent Flowsheet Documentation  Taken 1/2/2025 1510 by Leonie Dodson, RN  Pain Management Interventions: MD notified (comment)  Taken 1/2/2025 1138 by Leonie Dodson, RN  Pain Management Interventions: medication (see MAR)          "

## 2025-01-03 NOTE — PROGRESS NOTES
01/03/25 1300   Appointment Info   Signing Clinician's Name / Credentials (PT) Mariya Arevalo DPT   Physical Therapy Goals   PT Frequency Daily   PT Predicted Duration/Target Date for Goal Attainment 01/04/25   PT Goals Bed Mobility;Transfers;Gait;Stairs   PT: Bed Mobility Independent   PT: Transfers Independent;Sit to/from stand;Bed to/from chair   PT: Gait Independent;Assistive device;Straight cane;100 feet   PT: Stairs Independent;Assistive device;4 stairs;Rail on right   Interventions   Interventions Quick Adds Therapeutic Activity;Gait Training   Therapeutic Activity   Symptoms Noted During/After Treatment Fatigue;Increased pain   Treatment Detail/Skilled Intervention Patient was seated in chair upon PT entering the room. Patient is agreeable to in room therapy. Patient currently reports back pain that is chronic in nature.  socks were on. Gait belt was donned in sitting. Patient was CGA in order to stand using steady cane. Patient ambulated from recliner to bathroom. Patient required assistance with urinal. Patient ambulated from bathroom back to recliner with CGA using steady cane. Patient ambulated about  30 feet total in room. Once seated patient was CGA to min assist in order to complete some upper body cares. Patient tolerated therapy session fair. Patient reports pain through legs when standing and walking. Patient ambulates with a bradykinetic gait speed. Patient demonstrates a short shuffled step length. Patient is slightly unsteady with ambulation. Patient would benefit from continued skilled physical therapy in order to improve strength, balance and endurance in order to facilitate return to PLOF. Patient was seated in chair with call light in reach and posey alarm on upon PT exiting the room.   Gait Training   Gait Training Minutes (26682) 15   Symptoms Noted During/After Treatment (Gait Training) fatigue   Treatment Detail/Skilled Intervention pt agreeable to therapy, sit<>stand from  recliner to cane with CGA, gait with wide ANN MARIE and CGA for 80 feet with step through pattern and minimal foot clearance. pt pleasant and wanting to discharge to home with son tomorrow. is agreeable to homecare therapy to address endurance deficits that are long standing   Distance in Feet 80 feet   [place] Level (Gait Training) contact guard   Physical Assistance Level (Gait Training) 1 person assist   Weight Bearing (Gait Training) full weight-bearing   Assistive Device (Gait Training) straight cane   Pattern Analysis (Gait Training) swing-through gait   Gait Analysis Deviations increased stride width;decreased step length   PT Discharge Planning   PT Plan Continue PT   PT Discharge Recommendation (DC Rec) home with home care physical therapy;home with assist   PT Rationale for DC Rec Patient would benefit from continued skilled physical therapy in order to improve strength, balance and endurance in order to facilitate return to PLOF.   PT Brief overview of current status pt agreeable to therapy, will discharge to home with son to assist for the day tomorrow and then homecare will start monday. Pt is CGA for transfers and gait of 80 feet with personal cane.   Physical Therapy Time and Intention   Timed Code Treatment Minutes 15   Total Session Time (sum of timed and untimed services) 15

## 2025-01-03 NOTE — PROGRESS NOTES
01/03/25 0225   Appointment Info   Signing Clinician's Name / Credentials (OT) Daly Medellin OTR/L   Self-Care/Home Management   Treatment Detail/Skilled Intervention seated in recliner chair for light grooming and hygeine; set up and supervision for upper body, mod assist lower body cares   Lar[place] Level (Grooming Training) stand-by assist   Assistance (Grooming Training) supervision   Therapeutic Activities   Therapeutic Activity Minutes (16233) 20   Symptoms noted during/after treatment none   Treatment Detail/Skilled Intervention pt able to tolerate more activity today with less complaints of back pain and increase endurance; ambulated with cane out in hallway with CGA.   OT Discharge Planning   OT Plan continue to progress activity/ functional endurance   OT Discharge Recommendation (DC Rec) home with assist;home with home care occupational therapy   OT Rationale for DC Rec pt is not at baseline and would benefit from home care services to increase strength and safety with performance of daily self cares and mobility tasks.   OT Brief overview of current status pt has improving activity tolerance, no complaints of back pain during todays session; continues to require assist with lower body self cares and decreased tolerance for mobiltiy.

## 2025-01-04 VITALS
OXYGEN SATURATION: 93 % | HEART RATE: 104 BPM | DIASTOLIC BLOOD PRESSURE: 66 MMHG | TEMPERATURE: 97.8 F | BODY MASS INDEX: 34.84 KG/M2 | WEIGHT: 216.8 LBS | SYSTOLIC BLOOD PRESSURE: 104 MMHG | HEIGHT: 66 IN | RESPIRATION RATE: 16 BRPM

## 2025-01-04 LAB
ANION GAP SERPL CALCULATED.3IONS-SCNC: 8 MMOL/L (ref 7–15)
BUN SERPL-MCNC: 44 MG/DL (ref 8–23)
CALCIUM SERPL-MCNC: 8.7 MG/DL (ref 8.8–10.4)
CHLORIDE SERPL-SCNC: 97 MMOL/L (ref 98–107)
CREAT SERPL-MCNC: 2.39 MG/DL (ref 0.67–1.17)
EGFRCR SERPLBLD CKD-EPI 2021: 26 ML/MIN/1.73M2
ERYTHROCYTE [DISTWIDTH] IN BLOOD BY AUTOMATED COUNT: 15.3 % (ref 10–15)
GLUCOSE BLDC GLUCOMTR-MCNC: 80 MG/DL (ref 70–99)
GLUCOSE SERPL-MCNC: 90 MG/DL (ref 70–99)
HCO3 SERPL-SCNC: 31 MMOL/L (ref 22–29)
HCT VFR BLD AUTO: 28.8 % (ref 40–53)
HGB BLD-MCNC: 9.5 G/DL (ref 13.3–17.7)
MCH RBC QN AUTO: 31.5 PG (ref 26.5–33)
MCHC RBC AUTO-ENTMCNC: 33 G/DL (ref 31.5–36.5)
MCV RBC AUTO: 95 FL (ref 78–100)
PLATELET # BLD AUTO: 118 10E3/UL (ref 150–450)
POTASSIUM SERPL-SCNC: 4.1 MMOL/L (ref 3.4–5.3)
RBC # BLD AUTO: 3.02 10E6/UL (ref 4.4–5.9)
SODIUM SERPL-SCNC: 136 MMOL/L (ref 135–145)
WBC # BLD AUTO: 5.2 10E3/UL (ref 4–11)

## 2025-01-04 PROCEDURE — 82565 ASSAY OF CREATININE: CPT | Performed by: INTERNAL MEDICINE

## 2025-01-04 PROCEDURE — 250N000013 HC RX MED GY IP 250 OP 250 PS 637: Performed by: INTERNAL MEDICINE

## 2025-01-04 PROCEDURE — 85041 AUTOMATED RBC COUNT: CPT | Performed by: INTERNAL MEDICINE

## 2025-01-04 PROCEDURE — 82374 ASSAY BLOOD CARBON DIOXIDE: CPT | Performed by: INTERNAL MEDICINE

## 2025-01-04 PROCEDURE — 82435 ASSAY OF BLOOD CHLORIDE: CPT | Performed by: INTERNAL MEDICINE

## 2025-01-04 PROCEDURE — 99239 HOSP IP/OBS DSCHRG MGMT >30: CPT | Performed by: INTERNAL MEDICINE

## 2025-01-04 PROCEDURE — 250N000013 HC RX MED GY IP 250 OP 250 PS 637: Performed by: FAMILY MEDICINE

## 2025-01-04 PROCEDURE — 85014 HEMATOCRIT: CPT | Performed by: INTERNAL MEDICINE

## 2025-01-04 PROCEDURE — 80048 BASIC METABOLIC PNL TOTAL CA: CPT | Performed by: INTERNAL MEDICINE

## 2025-01-04 PROCEDURE — 36415 COLL VENOUS BLD VENIPUNCTURE: CPT | Performed by: INTERNAL MEDICINE

## 2025-01-04 RX ORDER — TORSEMIDE 10 MG/1
TABLET ORAL
COMMUNITY
Start: 2025-01-04

## 2025-01-04 RX ADMIN — ASPIRIN 81 MG 81 MG: 81 TABLET ORAL at 09:58

## 2025-01-04 RX ADMIN — Medication 400 MG: at 09:58

## 2025-01-04 RX ADMIN — METOPROLOL SUCCINATE 12.5 MG: 25 TABLET, EXTENDED RELEASE ORAL at 09:57

## 2025-01-04 RX ADMIN — LORATADINE 10 MG: 10 TABLET ORAL at 09:58

## 2025-01-04 RX ADMIN — POTASSIUM CHLORIDE 10 MEQ: 750 TABLET, FILM COATED, EXTENDED RELEASE ORAL at 09:58

## 2025-01-04 RX ADMIN — TORSEMIDE 20 MG: 10 TABLET ORAL at 07:47

## 2025-01-04 ASSESSMENT — ACTIVITIES OF DAILY LIVING (ADL)
ADLS_ACUITY_SCORE: 44
ADLS_ACUITY_SCORE: 44
ADLS_ACUITY_SCORE: 47
ADLS_ACUITY_SCORE: 44
ADLS_ACUITY_SCORE: 47
ADLS_ACUITY_SCORE: 44
ADLS_ACUITY_SCORE: 47
ADLS_ACUITY_SCORE: 44
ADLS_ACUITY_SCORE: 44

## 2025-01-04 NOTE — PHARMACY
[facility]  Part of [facility]  [address] [place] [id number]    January 4, 2025    Dear Pharmacist,    Your customer, Duane H Telecky, born on 1940, was recently discharged from [facility].  We have updated his medication list and want to alert you to the following:       Review of your medicines        CONTINUE these medicines which may have CHANGED, or have new prescriptions. If we are uncertain of the size of tablets/capsules you have at home, strength may be listed as something that might have changed.        Dose / Directions   torsemide 10 MG tablet  Commonly known as: DEMADEX  This may have changed:   how much to take  how to take this  when to take this  additional instructions      Take 2 pills twice daily Saturday and Sunday, then 1 pill twice daily starting Monday the 6th.  Refills: 0            CONTINUE these medicines which have NOT CHANGED        Dose / Directions   Accu-Chek Bouchra Plus test strip  Generic drug: blood glucose      USE TO CHECK BLOOD SUGARS ONCE DAILY OR AS DIRECTED, ICD-10-CM: E11.9, NON INSULIN DEPENDENT.  Refills: 0     allopurinol 300 MG tablet  Commonly known as: ZYLOPRIM      Dose: 150 mg  Take 150 mg by mouth daily.  Refills: 0     aspirin 81 MG chewable tablet  Commonly known as: ASA      Dose: 81 mg  Take 81 mg by mouth daily Chew and swallow, take with food  Refills: 0     atorvastatin 40 MG tablet  Commonly known as: LIPITOR      Dose: 40 mg  Take 40 mg by mouth At Bedtime  Refills: 0     blood glucose monitoring lancets      USE TO CHECK BLOOD SUGARS ONCE DAILY AS DIRECTED  Refills: 0     cetirizine 10 MG tablet  Commonly known as: zyrTEC      Dose: 10 mg  Take 10 mg by mouth daily  Refills: 0     ciclopirox 0.77 % cream  Commonly known as: LOPROX      Apply topically 2 times daily as needed (Rash on feet).  Refills: 0     doxazosin 4 MG tablet  Commonly known as: CARDURA      Dose: 6 mg  Take 6 mg by mouth  at bedtime  Refills: 0     levocetirizine 5 MG tablet  Commonly known as: XYZAL      Dose: 2.5 mg  Take 2.5 mg by mouth every evening.  Refills: 0     magnesium oxide 400 (241.3 Mg) MG tablet  Commonly known as: MAG-OX      Dose: 400 mg  Take 400 mg by mouth 2 times daily  Refills: 0     nitroGLYcerin 0.4 MG sublingual tablet  Commonly known as: NITROSTAT      Dose: 0.4 mg  Place 0.4 mg under the tongue every 5 minutes as needed for chest pain Do not crush; maximum of 3 doses in 15 minutes  Refills: 0     potassium chloride ER 10 MEQ CR tablet  Commonly known as: K-TAB/KLOR-CON      Dose: 10 mEq  Take 10 mEq by mouth daily  Refills: 0     triamcinolone 0.5 % external cream  Commonly known as: ARISTOCORT HP      Apply topically 2 times daily as needed for irritation.  Refills: 0     verapamil  MG CR tablet  Commonly known as: CALAN-SR      Dose: 120 mg  Take 120 mg by mouth daily.  Refills: 0     Vitamin D (Cholecalciferol) 25 MCG (1000 UT) Caps      Dose: 2 capsule  Take 2 capsules by mouth daily  Refills: 0              We also reviewed Duane H Telecky's allergy list and updated it as needed:  Allergies: Clonidine, Lisinopril, Furosemide, Oxycodone, Rivaroxaban, Amlodipine, Chlorthalidone, Coreg [carvedilol], Diltiazem, Hydralazine, and Warfarin    Thank you for continuing to care for Duaneirving Chen.  We look forward to working together with you in the future.    Sincerely,  Ruben Lin, [facility]

## 2025-01-04 NOTE — PLAN OF CARE
A&Ox4. VSS, afebrile. Denies pain on shift. LS clear, diminished in bases. LOPEZ. BLE edema 1+. Adequate bowel movements per pt. Relief of abdominal fullness/discomfort. Left forearm skin tear remains covered with Allevyn. Ambulating with SBA with cane. Promoting self care.     Temp: 98.2  F (36.8  C) Temp src: Tympanic BP: 137/78 Pulse: 103   Resp: 16 SpO2: 96 % O2 Device: BiPAP/CPAP        Soledad Metz RN .......  1/4/2025  4:39 AM      Goal Outcome Evaluation:      Plan of Care Reviewed With: patient    Overall Patient Progress: improving    Outcome Evaluation: VSS, afebrile, denies pain, SBA with cane, improving edema

## 2025-01-04 NOTE — PHARMACY - DISCHARGE MEDICATION RECONCILIATION AND EDUCATION
Pharmacy:  Discharge Counseling and Medication Reconciliation    Duane H Telemarinamelissa  [address]   [facility] [address]  [phone] (home)   84 year old male  PCP: Opal Vizcaino    Allergies: Clonidine, Lisinopril, Furosemide, Oxycodone, Rivaroxaban, Amlodipine, Chlorthalidone, Coreg [carvedilol], Diltiazem, Hydralazine, and Warfarin    Discharge Counseling:    Pharmacist met with patient (and/or family) today to review the medication portion of the After Visit Summary and to address patient's questions/concerns.    Summary of Education: Counseled patient on dose increase of Torsemide- pt is to take 20 mg BID today and tomorrow, then start 10 mg BID dosing on Monday. Patient advised that new Rx was not sent to the pharmacy but he reports he has adequate supply of Torsemide at home.    Materials Provided:  MedCounselor sheets printed from Clinical Pharmacology on: N/A- no new medications prescribed.    Discharge Medication Reconciliation:    It has been determined that the patient has an adequate supply of medications available or which can be obtained from the patient's preferred pharmacy, which he has confirmed as: [facility] [An updated medication list will be faxed to the patient's pharmacy.]    Thank you for the consult.    Ruben Lin [facility]........January 4, 2025 9:41 AM

## 2025-01-04 NOTE — PLAN OF CARE
"Goal Outcome Evaluation:    Pt A/O. VSS. Chronic back pain managed at home. Declines PRN pain meds. Up SBA with cane in room. Generalized edema to legs. On RA. LS clear. Skin tear on left arm, Allevyn dressing changed. Allevyn dressing applied to right arm for small open area.     /66   Pulse 104   Temp 97.8  F (36.6  C) (Tympanic)   Resp 16   Ht 1.676 m (5' 6\")   Wt 98.3 kg (216 lb 12.8 oz)   SpO2 93%   BMI 34.99 kg/m         Plan of Care Reviewed With: patient    Overall Patient Progress: improvingOverall Patient Progress: improving    Outcome Evaluation: VSS. afebrile. chronic back pain.      "

## 2025-01-04 NOTE — DISCHARGE SUMMARY
"[facility]  Hospitalist Discharge Summary      Date of Admission:  1/1/2025  Date of Discharge:  1/4/2025  Discharging Provider: Anson Sweeney MD  Discharge Service: Hospitalist Service    Discharge Diagnoses   Principal Problem:    Acute on chronic heart failure with preserved ejection fraction (HFpEF) (H)  Active Problems:    CKD (chronic kidney disease) stage 4, GFR 15-29 ml/min (H)    Primary hypertension    Paroxysmal atrial fibrillation (H)    ASCVD (arteriosclerotic cardiovascular disease)    Type 2 diabetes mellitus without complication (H)    Iron deficiency anemia    Presence of Watchman left atrial appendage closure device    Elevated troponin level        Clinically Significant Risk Factors     # DMII: A1C = 6.7 % (Ref range: <5.7 %) within past 6 months  # Obesity: Estimated body mass index is 34.99 kg/m  as calculated from the following:    Height as of this encounter: 1.676 m (5' 6\").    Weight as of this encounter: 98.3 kg (216 lb 12.8 oz).       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  Symptoms have been present for a little over a week but the past 4 days have been worse.  He has gained 17 pounds and can no longer walk from his house to his garage without having to rest due to dyspnea.  Denies chest pain or fever.  Only had this type of dyspnea following his " CABG when he reports he had bleeding after the procedure and had to have it drained out of his back.  Legs have been swelling and red and his abdomen has been distended with gas.  Has been taking torsemide 10 mg daily but does not feel it has been working.  In the ER he was found to have BUN 41.5, Cr 2.89, GFR 21 with his baseline around Cr 2.4.  BNP elevated at 4,226.  Initial troponin 68, repeat down to 61.  Hgb 10.4 which is his baseline.  CXR with bilateral pleural effusions and mild atelectasis.  He was given additional torsemide and admitted for continued cares.    Principal Problem:    Acute on chronic heart failure with preserved ejection fraction (HFpEF) (H)    CXR without pulmonary edema but there are effusions, weight gain, dyspnea, edema, elevated BNP and troponin consistent with this being the etiology.  Increase diuresis to torsemide 20 mg po BID (allergic to furosemide with a rash).  Follow labs, I&O, daily weights, symptoms.  Last echo from 10/10/2024 with EF 60-65% so unless symptoms worsen will not repeat as it is less than 6 months ago.  On a diuretic and beta blocker but not SGLP, not on ACE/ARB as EF > 40% and renal failure.  Will refer to MTM after discharge for medication optimization.  Had > 1 liter net output but gained 5 pounds?  Feeling better, renal function slightly improved.  Continue aggressive torsemide dosing at 20 mg BID through Sunday with resuming 10 mg BID on Jan 6.    Active Problems:    CKD (chronic kidney disease) stage 4, GFR 15-29 ml/min (H)    Slightly improved with increased diuresis.  Follow labs.  Avoid nephrotoxic medications including allopurinol and metformin which are held.      Primary hypertension    Stable.  Slightly elevated at the time of admission but improved with doxazosin, Toprol and increased dose of torsemide and follow.      Paroxysmal atrial fibrillation (H)    Presence of Watchman left atrial appendage closure device    Heart sounds regular, EKG shows  atrial fib but QRS is fairly regular.  Continue other cardiac medications as above.      ASCVD (arteriosclerotic cardiovascular disease)    No chest pain but has CHF exacerbation so treat with increased diuresis.  Continue ASA 81 mg daily, Lipitor 40 mg daily, doxazosin 6 mg daily, magnesium, Toprol and torsemide.       Type 2 diabetes mellitus without complication (H)    Last HgbA1C noted in the chart from 10/30/2024 was only 5.9 but now is 6.7 so still controlled.  Not on any medications to treat this.      Iron deficiency anemia    Usual Hgb is around 10 but decreased to 9.2 after diuresis.  Not on iron supplement now.  Should follow-up with his PCP for further management.  Target Hgb in someone with CAD is > 8-9 so his is still adequate.          Elevated troponin level    Decreased at recheck.  Likely due to type II NSTEMI due to CHF exacerbation with fluid overload.  Will not trend further as already improving unless he develops additional symptoms.    Consultations This Hospital Stay   PHYSICAL THERAPY ADULT IP CONSULT  OCCUPATIONAL THERAPY ADULT IP CONSULT  CARE MANAGEMENT / SOCIAL WORK IP CONSULT  SOCIAL WORK IP CONSULT    Code Status   No CPR- Do NOT Intubate    Time Spent on this Encounter   I, Anson Sweeney MD, personally saw the patient today and spent greater than 30 minutes discharging this patient.       Anson Sweeney MD  [facility]  [address]  [facility] [address]  Phone: [phone]  Fax: [phone]  ______________________________________________________________________    Physical Exam   Vital Signs: Temp: 97.8  F (36.6  C) Temp src: Tympanic BP: 113/78 Pulse: 98   Resp: 16 SpO2: 93 % O2 Device: None (Room air)    Weight: 216 lbs 12.8 oz  GENERAL: Comfortable, no apparent distress.  CARDIOVASCULAR: regular rate and rhythm, no murmur. No lower extremity edema   RESPIRATORY: Clear to auscultation bilaterally, no wheezes or crackles.  GI: non-tender,  non-distended, normal bowel sounds.   SKIN: warm periphery, no rashes         Primary Care Physician   Opal Vizcaino    Discharge Orders      Med Therapy Management Referral      Follow-Up with Cardiology REGGIE Heart Failure Discharge      Reason for your hospital stay    Acute heart failure exacerbation     Follow-up and recommended labs and tests      Hospital [facility] at [facility] on 1/10 at 1245 with Karley Araujo     Activity    Your activity upon discharge: activity as tolerated     Diet    Follow this diet upon discharge: Low sodium, low carb       Significant Results and Procedures   Most Recent 3 CBC's:  Recent Labs   Lab Test 01/04/25  0615 01/03/25  0624 01/02/25  0536   WBC 5.2 5.7 5.3   HGB 9.5* 10.2* 9.2*   MCV 95 97 96   * 128* 136*     Most Recent 3 BMP's:  Recent Labs   Lab Test 01/04/25  0714 01/04/25  0615 01/03/25  2150 01/03/25  0717 01/03/25  0624 01/02/25  0809 01/02/25  0536   NA  --  136  --   --  136  --  136   POTASSIUM  --  4.1  --   --  4.1  --  4.6   CHLORIDE  --  97*  --   --  96*  --  100   CO2  --  31*  --   --  27  --  26   BUN  --  44.0*  --   --  44.2*  --  43.9*   CR  --  2.39*  --   --  2.58*  --  2.68*   ANIONGAP  --  8  --   --  13  --  10   PRAISH  --  8.7*  --   --  8.8  --  8.5*   GLC 80 90 111*   < > 101*   < > 90    < > = values in this interval not displayed.     Most Recent 2 LFT's:  Recent Labs   Lab Test 01/01/25  1107   AST 12   ALT <5   ALKPHOS 188*   BILITOTAL 0.5   ,   Results for orders placed or performed during the hospital encounter of 01/01/25   XR Chest Port 1 View    Narrative    EXAM: XR CHEST PORT 1 VIEW  LOCATION: [facility]  DATE: 1/1/2025    INDICATION: sob  COMPARISON: 9/15/2012      Impression    IMPRESSION: Postop CABG. Vascular stent upper mediastinum. Trace bilateral pleural effusions. Mild atelectasis in each lung base. Upper lungs are clear. No pulmonary edema.       Discharge Medications   Current Discharge  "Medication List        CONTINUE these medications which have CHANGED    Details   torsemide (DEMADEX) 10 MG tablet Take 2 pills twice daily Saturday and Sunday, then 1 pill twice daily starting Monday the 6th.           CONTINUE these medications which have NOT CHANGED    Details   allopurinol (ZYLOPRIM) 300 MG tablet Take 150 mg by mouth daily.      aspirin (ASA) 81 MG chewable tablet Take 81 mg by mouth daily Chew and swallow, take with food      cetirizine (ZYRTEC) 10 MG tablet Take 10 mg by mouth daily      ciclopirox (LOPROX) 0.77 % cream Apply topically 2 times daily as needed (Rash on feet).      magnesium oxide (MAG-OX) 400 (241.3 Mg) MG tablet Take 400 mg by mouth 2 times daily      potassium chloride ER (K-TAB/KLOR-CON) 10 MEQ CR tablet Take 10 mEq by mouth daily      triamcinolone (ARISTOCORT HP) 0.5 % external cream Apply topically 2 times daily as needed for irritation.      verapamil ER (CALAN-SR) 120 MG CR tablet Take 120 mg by mouth daily.      Vitamin D, Cholecalciferol, 25 MCG (1000 UT) CAPS Take 2 capsules by mouth daily      atorvastatin (LIPITOR) 40 MG tablet Take 40 mg by mouth At Bedtime      blood glucose (ACCU-CHEK MAIDA PLUS) test strip USE TO CHECK BLOOD SUGARS ONCE DAILY OR AS DIRECTED, ICD-10-CM: E11.9, NON INSULIN DEPENDENT.      blood glucose monitoring (SOFTCLIX) lancets USE TO CHECK BLOOD SUGARS ONCE DAILY AS DIRECTED      doxazosin (CARDURA) 4 MG tablet Take 6 mg by mouth at bedtime      levocetirizine (XYZAL) 5 MG tablet Take 2.5 mg by mouth every evening.      nitroGLYcerin (NITROSTAT) 0.4 MG sublingual tablet Place 0.4 mg under the tongue every 5 minutes as needed for chest pain Do not crush; maximum of 3 doses in 15 minutes           Allergies   Allergies   Allergen Reactions    Clonidine Shortness Of Breath, Dermatitis, Hives, Itching and Rash    Lisinopril      angioedema    Furosemide Rash     Rash and itchy    Oxycodone Visual Disturbance     \"I could see people trying to " "kill me\"    Rivaroxaban Rash    Amlodipine Unknown    Chlorthalidone      Dose over 12.5 mg daily causes creatinine to increase    Coreg [Carvedilol] Rash    Diltiazem Rash    Hydralazine Rash    Warfarin Rash     Rash resolved after stopping warfarin     "

## 2025-01-04 NOTE — CARE PLAN
[facility] DISCHARGE NOTE    Patient discharged to home at 11:10 AM via wheel chair. Accompanied by son and staff. Discharge instructions reviewed with patient, opportunity offered to ask questions. Prescriptions - None ordered for discharge. All belongings sent with patient.    Torres Metz RN

## 2025-01-04 NOTE — PLAN OF CARE
SAFETY CHECKLIST  ID Bands and Risk clasps correct and in place (DNR, Fall risk, Allergy, Latex, Limb):  Yes  All Lines Reconciled and labeled correctly: Yes  Whiteboard updated:Yes  Environmental interventions: Yes  Verify Tele #: 3    Soledad Metz RN .......  1/3/2025  7:48 PM

## 2025-01-06 ENCOUNTER — TELEPHONE (OUTPATIENT)
Dept: PHARMACY | Facility: OTHER | Age: 85
End: 2025-01-06
Payer: COMMERCIAL

## 2025-01-06 ENCOUNTER — PATIENT OUTREACH (OUTPATIENT)
Dept: FAMILY MEDICINE | Facility: OTHER | Age: 85
End: 2025-01-06
Payer: COMMERCIAL

## 2025-01-06 LAB
ATRIAL RATE - MUSE: 87 BPM
DIASTOLIC BLOOD PRESSURE - MUSE: NORMAL MMHG
INTERPRETATION ECG - MUSE: NORMAL
P AXIS - MUSE: NORMAL DEGREES
PR INTERVAL - MUSE: NORMAL MS
QRS DURATION - MUSE: 80 MS
QT - MUSE: 372 MS
QTC - MUSE: 415 MS
R AXIS - MUSE: 30 DEGREES
SYSTOLIC BLOOD PRESSURE - MUSE: NORMAL MMHG
T AXIS - MUSE: 37 DEGREES
VENTRICULAR RATE- MUSE: 75 BPM

## 2025-01-06 NOTE — TELEPHONE ENCOUNTER
Patient has PCP elsewhere, no follow-up here. No TCM call required per policy.  Kym Medellin RN on 1/6/2025 at 9:30 AM

## 2025-01-06 NOTE — TELEPHONE ENCOUNTER
MTM referral from: Transitions of Care (recent hospital discharge, TCU discharge, or ED visit)    MTM referral outreach attempt #1 on January 6, 2025 at 11:12 AM      Outcome: Patient is not interested at this time because has a follow up with his pcp @ [facility]     Use terrell  for the carrier/Plan on the flowsheet    Steph Sage CMA  MTM

## (undated) RX ORDER — BUPIVACAINE HYDROCHLORIDE 5 MG/ML
INJECTION, SOLUTION EPIDURAL; INTRACAUDAL
Status: DISPENSED
Start: 2022-10-18

## (undated) RX ORDER — DEXAMETHASONE SODIUM PHOSPHATE 10 MG/ML
INJECTION, SOLUTION INTRAMUSCULAR; INTRAVENOUS
Status: DISPENSED
Start: 2024-02-06

## (undated) RX ORDER — BUPIVACAINE HYDROCHLORIDE 5 MG/ML
INJECTION, SOLUTION EPIDURAL; INTRACAUDAL
Status: DISPENSED
Start: 2023-01-09

## (undated) RX ORDER — LIDOCAINE HYDROCHLORIDE 10 MG/ML
INJECTION, SOLUTION INFILTRATION; PERINEURAL
Status: DISPENSED
Start: 2023-01-09

## (undated) RX ORDER — NALOXONE HYDROCHLORIDE 0.4 MG/ML
INJECTION, SOLUTION INTRAMUSCULAR; INTRAVENOUS; SUBCUTANEOUS
Status: DISPENSED
Start: 2024-02-06

## (undated) RX ORDER — LIDOCAINE HYDROCHLORIDE 10 MG/ML
INJECTION, SOLUTION INFILTRATION; PERINEURAL
Status: DISPENSED
Start: 2023-04-10

## (undated) RX ORDER — FENTANYL CITRATE 50 UG/ML
INJECTION, SOLUTION INTRAMUSCULAR; INTRAVENOUS
Status: DISPENSED
Start: 2024-02-06

## (undated) RX ORDER — BUPIVACAINE HYDROCHLORIDE 5 MG/ML
INJECTION, SOLUTION EPIDURAL; INTRACAUDAL
Status: DISPENSED
Start: 2023-04-10

## (undated) RX ORDER — DEXAMETHASONE SODIUM PHOSPHATE 10 MG/ML
INJECTION, SOLUTION INTRAMUSCULAR; INTRAVENOUS
Status: DISPENSED
Start: 2023-04-10

## (undated) RX ORDER — BUPIVACAINE HYDROCHLORIDE 5 MG/ML
INJECTION, SOLUTION EPIDURAL; INTRACAUDAL
Status: DISPENSED
Start: 2024-02-06

## (undated) RX ORDER — LIDOCAINE HYDROCHLORIDE 10 MG/ML
INJECTION, SOLUTION INFILTRATION; PERINEURAL
Status: DISPENSED
Start: 2022-10-18

## (undated) RX ORDER — LIDOCAINE HYDROCHLORIDE 10 MG/ML
INJECTION, SOLUTION INFILTRATION; PERINEURAL
Status: DISPENSED
Start: 2024-02-06

## (undated) RX ORDER — NEOMYCIN/BACITRACIN/POLYMYXINB 3.5-400-5K
OINTMENT (GRAM) TOPICAL
Status: DISPENSED
Start: 2021-07-06